# Patient Record
Sex: FEMALE | Race: WHITE | Employment: FULL TIME | RURAL
[De-identification: names, ages, dates, MRNs, and addresses within clinical notes are randomized per-mention and may not be internally consistent; named-entity substitution may affect disease eponyms.]

---

## 2017-07-10 ENCOUNTER — OFFICE VISIT (OUTPATIENT)
Dept: FAMILY MEDICINE CLINIC | Age: 52
End: 2017-07-10

## 2017-07-10 VITALS
DIASTOLIC BLOOD PRESSURE: 70 MMHG | HEART RATE: 93 BPM | TEMPERATURE: 98 F | BODY MASS INDEX: 25.88 KG/M2 | SYSTOLIC BLOOD PRESSURE: 108 MMHG | HEIGHT: 66 IN | OXYGEN SATURATION: 97 % | RESPIRATION RATE: 20 BRPM | WEIGHT: 161 LBS

## 2017-07-10 DIAGNOSIS — E03.9 ACQUIRED HYPOTHYROIDISM: ICD-10-CM

## 2017-07-10 DIAGNOSIS — R73.03 PREDIABETES: ICD-10-CM

## 2017-07-10 DIAGNOSIS — E28.319 MENOPAUSE, PREMATURE: ICD-10-CM

## 2017-07-10 DIAGNOSIS — Z00.00 ROUTINE MEDICAL EXAM: Primary | ICD-10-CM

## 2017-07-10 LAB
BILIRUB UR QL STRIP: NEGATIVE
GLUCOSE UR-MCNC: NEGATIVE MG/DL
KETONES P FAST UR STRIP-MCNC: NORMAL MG/DL
PH UR STRIP: 5 [PH] (ref 4.6–8)
PROT UR QL STRIP: NEGATIVE MG/DL
SP GR UR STRIP: 1.02 (ref 1–1.03)
UA UROBILINOGEN AMB POC: NORMAL (ref 0.2–1)
URINALYSIS CLARITY POC: CLEAR
URINALYSIS COLOR POC: YELLOW
URINE BLOOD POC: NEGATIVE
URINE LEUKOCYTES POC: NEGATIVE
URINE NITRITES POC: NEGATIVE

## 2017-07-10 NOTE — PROGRESS NOTES
Subjective:   54 y.o. female for Well Woman Check. Her gyne and breast care is done elsewhere by her Ob-Gyne physician Jan 2017. Patient Active Problem List    Diagnosis Date Noted    Well woman exam with routine gynecological exam 09/30/2015    Unspecified hypothyroidism 09/30/2015    Screening for breast cancer 09/30/2015    Menopause, premature 10/10/2011    Prediabetes     Hypothyroidism      Current Outpatient Prescriptions   Medication Sig Dispense Refill    Cetirizine (ZYRTEC) 10 mg cap Take  by mouth.  OTHER,NON-FORMULARY, Apply 0.5 mL to affected area two (2) times a day. .5ml= Biest 0.6mg, Progesterone 35mg, testosterone 1 mg(compound cream). Sig: Apply 0.5ml vaginally twice a day. 90 days supply. 3 Each 3    thyroid, Pork, (ARMOUR THYROID) 60 mg tablet Take 1 Tab by mouth daily for 90 days. 90 Tab 3    ergocalciferol (VITAMIN D2) 50,000 unit capsule Take 50,000 Units by mouth every seven (7) days.  omega-3 fatty acids-vitamin e (FISH OIL) 1,000 mg cap Take 1 capsule by mouth.  albuterol (PROVENTIL HFA, VENTOLIN HFA, PROAIR HFA) 90 mcg/actuation inhaler Take 2 Puffs by inhalation every four (4) hours as needed for Wheezing. 3 Inhaler 1    acyclovir (ZOVIRAX) 5 % ointment Apply  to affected area every three (3) hours. 2 g 1     Allergies   Allergen Reactions    Pcn [Penicillins] Rash     Past Medical History:   Diagnosis Date    Hypothyroidism     Prediabetes     Thyroid disease      Past Surgical History:   Procedure Laterality Date    HX GYN      HX PARTIAL HYSTERECTOMY  2003     Family History   Problem Relation Age of Onset    Other Father      aortic anuerysm     Social History   Substance Use Topics    Smoking status: Never Smoker    Smokeless tobacco: Never Used    Alcohol use Yes      Comment: socially             Specific concerns today: routine physical.    Review of Systems  Pertinent items are noted in HPI.     Objective:   Blood pressure 108/70, pulse 93, temperature 98 °F (36.7 °C), temperature source Oral, resp. rate 20, height 5' 6\" (1.676 m), weight 161 lb (73 kg), SpO2 97 %. Physical Examination:   General appearance - alert, well appearing, and in no distress  Eyes - pupils equal and reactive, extraocular eye movements intact  Ears - bilateral TM's and external ear canals normal  Nose - normal and patent, no erythema, discharge or polyps  Mouth - mucous membranes moist, pharynx normal without lesions  Neck - supple, no significant adenopathy  Chest - clear to auscultation, no wheezes, rales or rhonchi, symmetric air entry  Heart - normal rate, regular rhythm, normal S1, S2, no murmurs, rubs, clicks or gallops  Abdomen - soft, nontender, nondistended, no masses or organomegaly  Neurological - alert, oriented, normal speech, no focal findings or movement disorder noted  Extremities - peripheral pulses normal, no pedal edema, no clubbing or cyanosis     Assessment/Plan:   Well adult exam.      ICD-10-CM ICD-9-CM    1. Routine medical exam Z00.00 V70.0 AMB POC URINALYSIS DIP STICK AUTO W/O MICRO   2. Menopause, premature E28.319 256.31 OTHER,NON-FORMULARY,   3. Prediabetes R73.03 790.29    4.  Acquired hypothyroidism E03.9 244.9

## 2017-07-10 NOTE — PROGRESS NOTES
Identified pt with two pt identifiers(name and ). Chief Complaint   Patient presents with    Physical     work papers        There are no preventive care reminders to display for this patient. Wt Readings from Last 3 Encounters:   07/10/17 161 lb (73 kg)   16 154 lb (69.9 kg)   09/30/15 147 lb (66.7 kg)     Temp Readings from Last 3 Encounters:   07/10/17 98 °F (36.7 °C) (Oral)   16 98.2 °F (36.8 °C) (Oral)   09/30/15 98.4 °F (36.9 °C) (Oral)     BP Readings from Last 3 Encounters:   07/10/17 108/70   16 99/63   09/30/15 126/76     Pulse Readings from Last 3 Encounters:   07/10/17 93   16 86   09/30/15 77         Learning Assessment:  :     Learning Assessment 3/24/2014   PRIMARY LEARNER Patient   HIGHEST LEVEL OF EDUCATION - PRIMARY LEARNER  4 YEARS OF COLLEGE   BARRIERS PRIMARY LEARNER NONE   CO-LEARNER CAREGIVER No   PRIMARY LANGUAGE ENGLISH   LEARNER PREFERENCE PRIMARY LISTENING   ANSWERED BY patient   RELATIONSHIP SELF       Depression Screening:  :     PHQ over the last two weeks 2016   Little interest or pleasure in doing things Not at all   Feeling down, depressed or hopeless Not at all   Total Score PHQ 2 0       Fall Risk Assessment:  :     Fall Risk Assessment, last 12 mths 7/10/2017   Able to walk? Yes   Fall in past 12 months? No       Abuse Screening:  :     Abuse Screening Questionnaire 2016 3/24/2014   Do you ever feel afraid of your partner? N N   Are you in a relationship with someone who physically or mentally threatens you? N N   Is it safe for you to go home? Y Y       Coordination of Care Questionnaire:  :     1) Have you been to an emergency room, urgent care clinic since your last visit? no   Hospitalized since your last visit? no             2) Have you seen or consulted any other health care providers outside of [address] [place] [address] since your last visit?  yes  OB GYN (Include any pap smears or colon screenings in this section.)    3) Do you have an Advance Directive on file? No she has her own   Are you interested in receiving information about Advance Directives? no    Reviewed record in preparation for visit and have obtained necessary documentation. Medication reconciliation up to date and corrected with patient at this time.      Results for orders placed or performed in visit on 07/10/17   AMB POC URINALYSIS DIP STICK AUTO W/O MICRO   Result Value Ref Range    Color (UA POC) Yellow     Clarity (UA POC) Clear     Glucose (UA POC) Negative Negative    Bilirubin (UA POC) Negative Negative    Ketones (UA POC) Trace Negative    Specific gravity (UA POC) 1.025 1.001 - 1.035    Blood (UA POC) Negative Negative    pH (UA POC) 5.0 4.6 - 8.0    Protein (UA POC) Negative Negative mg/dL    Urobilinogen (UA POC) 0.2 mg/dL 0.2 - 1    Nitrites (UA POC) Negative Negative    Leukocyte esterase (UA POC) Negative Negative

## 2017-10-16 DIAGNOSIS — R06.2 WHEEZING: ICD-10-CM

## 2017-10-16 DIAGNOSIS — E03.9 ACQUIRED HYPOTHYROIDISM: ICD-10-CM

## 2017-10-16 RX ORDER — LEVOTHYROXINE AND LIOTHYRONINE 38; 9 UG/1; UG/1
60 TABLET ORAL DAILY
Qty: 90 TAB | Refills: 3 | Status: SHIPPED | OUTPATIENT
Start: 2017-10-16 | End: 2018-09-25 | Stop reason: SDUPTHER

## 2017-10-16 RX ORDER — ALBUTEROL SULFATE 90 UG/1
2 AEROSOL, METERED RESPIRATORY (INHALATION)
Qty: 3 INHALER | Refills: 1 | Status: SHIPPED | OUTPATIENT
Start: 2017-10-16 | End: 2019-03-22 | Stop reason: SDUPTHER

## 2017-10-18 DIAGNOSIS — R06.2 WHEEZING: ICD-10-CM

## 2017-10-18 DIAGNOSIS — E03.9 ACQUIRED HYPOTHYROIDISM: ICD-10-CM

## 2017-10-18 RX ORDER — ALBUTEROL SULFATE 90 UG/1
2 AEROSOL, METERED RESPIRATORY (INHALATION)
Qty: 3 INHALER | Refills: 1 | Status: CANCELLED | OUTPATIENT
Start: 2017-10-18

## 2017-10-18 RX ORDER — LEVOTHYROXINE AND LIOTHYRONINE 38; 9 UG/1; UG/1
60 TABLET ORAL DAILY
Qty: 90 TAB | Refills: 3 | Status: CANCELLED | OUTPATIENT
Start: 2017-10-18 | End: 2018-01-16

## 2018-05-10 DIAGNOSIS — E28.319 MENOPAUSE, PREMATURE: ICD-10-CM

## 2018-09-25 ENCOUNTER — OFFICE VISIT (OUTPATIENT)
Dept: FAMILY MEDICINE CLINIC | Age: 53
End: 2018-09-25

## 2018-09-25 VITALS
WEIGHT: 152 LBS | TEMPERATURE: 98.9 F | BODY MASS INDEX: 24.43 KG/M2 | HEIGHT: 66 IN | RESPIRATION RATE: 18 BRPM | SYSTOLIC BLOOD PRESSURE: 122 MMHG | HEART RATE: 80 BPM | DIASTOLIC BLOOD PRESSURE: 83 MMHG

## 2018-09-25 DIAGNOSIS — Z13.220 SCREENING FOR CHOLESTEROL LEVEL: ICD-10-CM

## 2018-09-25 DIAGNOSIS — Z23 ENCOUNTER FOR IMMUNIZATION: ICD-10-CM

## 2018-09-25 DIAGNOSIS — R73.03 PREDIABETES: ICD-10-CM

## 2018-09-25 DIAGNOSIS — B00.1 HERPES LABIALIS: ICD-10-CM

## 2018-09-25 DIAGNOSIS — Z11.3 SCREENING FOR STD (SEXUALLY TRANSMITTED DISEASE): ICD-10-CM

## 2018-09-25 DIAGNOSIS — Z12.39 SCREENING FOR BREAST CANCER: ICD-10-CM

## 2018-09-25 DIAGNOSIS — E03.9 ACQUIRED HYPOTHYROIDISM: Primary | ICD-10-CM

## 2018-09-25 DIAGNOSIS — E28.319 MENOPAUSE, PREMATURE: ICD-10-CM

## 2018-09-25 DIAGNOSIS — Z12.11 SCREENING FOR COLON CANCER: ICD-10-CM

## 2018-09-25 RX ORDER — LEVOTHYROXINE AND LIOTHYRONINE 38; 9 UG/1; UG/1
60 TABLET ORAL DAILY
Qty: 90 TAB | Refills: 1 | Status: SHIPPED | OUTPATIENT
Start: 2018-09-25 | End: 2019-03-24 | Stop reason: SDUPTHER

## 2018-09-25 RX ORDER — ACYCLOVIR 50 MG/G
OINTMENT TOPICAL
Qty: 15 G | Refills: 2 | Status: SHIPPED | OUTPATIENT
Start: 2018-09-25 | End: 2022-01-21 | Stop reason: SDUPTHER

## 2018-09-25 NOTE — PROGRESS NOTES
Subjective:      Giuseppe Aguliar is an 50 y.o. female who presents for followup of hypothyroidism. Thyroid ROS: denies fatigue, weight changes, heat/cold intolerance, bowel/skin changes or CVS symptoms. Pt states that she would like a flu vaccine today. In addition, she was informed by a previous sexual partner that he tested positive for chlamydia. She has had no symptoms, but would like testing. No change in vaginal discharge, no vaginal bleeding. In addition, she is due for colonoscopy. Patient Active Problem List    Diagnosis Date Noted    Well woman exam with routine gynecological exam 09/30/2015    Unspecified hypothyroidism 09/30/2015    Screening for breast cancer 09/30/2015    Menopause, premature 10/10/2011    Prediabetes     Hypothyroidism      Current Outpatient Prescriptions   Medication Sig Dispense Refill    acyclovir (ZOVIRAX) 5 % ointment Apply  to affected area every three (3) hours. 15 g 2    thyroid, Pork, (ARMOUR THYROID) 60 mg tablet Take 1 Tab by mouth daily for 90 days. 90 Tab 1    OTHER,NON-FORMULARY, Apply 0.5 mL to affected area two (2) times a day. .5ml= Biest 0.6mg, Progesterone 35mg, testosterone 1 mg(compound cream). Sig: Apply 0.5ml vaginally twice a day. 90 days supply. 3 Each 3    Cetirizine (ZYRTEC) 10 mg cap Take  by mouth.  albuterol (PROVENTIL HFA, VENTOLIN HFA, PROAIR HFA) 90 mcg/actuation inhaler Take 2 Puffs by inhalation every four (4) hours as needed for Wheezing. 3 Inhaler 1    ergocalciferol (VITAMIN D2) 50,000 unit capsule Take 50,000 Units by mouth every seven (7) days.  omega-3 fatty acids-vitamin e (FISH OIL) 1,000 mg cap Take 1 capsule by mouth.        Allergies   Allergen Reactions    Pcn [Penicillins] Rash     Past Medical History:   Diagnosis Date    Hypothyroidism     Prediabetes     Thyroid disease      Past Surgical History:   Procedure Laterality Date    HX GYN      HX PARTIAL HYSTERECTOMY  2003     Family History Problem Relation Age of Onset    Other Father      aortic anuerysm     Social History   Substance Use Topics    Smoking status: Never Smoker    Smokeless tobacco: Never Used    Alcohol use Yes      Comment: socially        Objective:     Visit Vitals    /83    Pulse 80    Temp 98.9 °F (37.2 °C) (Oral)    Resp 18    Ht 5' 6\" (1.676 m)    Wt 152 lb (68.9 kg)    BMI 24.53 kg/m2     Exam: thyroid is normal in size without nodules or tenderness. Laboratory:  Lab Results   Component Value Date/Time    TSH 0.963 11/02/2016 10:10 AM       Assessment/Plan:     hypothyroidism well controlled, stable, asymptomatic.   current treatment plan effective, no change in therapy. ICD-10-CM ICD-9-CM    1. Acquired hypothyroidism E03.9 244.9 thyroid, Pork, (ARMOUR THYROID) 60 mg tablet      THYROID CASCADE PROFILE      CBC W/O DIFF      METABOLIC PANEL, COMPREHENSIVE   2. Herpes labialis B00.1 054.9 acyclovir (ZOVIRAX) 5 % ointment   3. Menopause, premature E28.319 256.31 OTHER,NON-FORMULARY,   4. Prediabetes R73.03 790.29 HEMOGLOBIN A1C WITH EAG   5. Screening for colon cancer Z12.11 V76.51 REFERRAL TO GASTROENTEROLOGY      OCCULT BLOOD IMMUNOASSAY,DIAGNOSTIC   6. Screening for cholesterol level Z13.220 V77.91 LIPID PANEL   7. Screening for STD (sexually transmitted disease) Z11.3 V74.5 NUSWAB VAGINITIS PLUS   8. Encounter for immunization Z23 V03.89 INFLUENZA VIRUS VAC QUAD,SPLIT,PRESV FREE SYRINGE IM   9. Screening for breast cancer Z12.31 V76.10 JESSY MAMMO BI SCREENING INCL CAD   . Informed patient that we will notify her when labs return, and inform her of any change in plan of care at that time. Educated about calling for mammo and colonoscopy. Vaccine and VIS given. Pt informed to return to office with worsening of symptoms, or PRN with any questions or concerns. Pt verbalizes understanding of plan of care and denies further questions or concerns at this time.

## 2018-09-25 NOTE — PATIENT INSTRUCTIONS
Vaccine Information Statement    Influenza (Flu) Vaccine (Inactivated or Recombinant): What you need to know    Many Vaccine Information Statements are available in Serbian and other languages. See www.immunize.org/vis  Hojas de Información Sobre Vacunas están disponibles en Español y en muchos otros idiomas. Visite www.immunize.org/vis    1. Why get vaccinated? Influenza (flu) is a contagious disease that spreads around the [place] every year, usually between October and May. Flu is caused by influenza viruses, and is spread mainly by coughing, sneezing, and close contact. Anyone can get flu. Flu strikes suddenly and can last several days. Symptoms vary by age, but can include:   fever/chills   sore throat   muscle aches   fatigue   cough   headache    runny or stuffy nose    Flu can also lead to pneumonia and blood infections, and cause diarrhea and seizures in children. If you have a medical condition, such as heart or lung disease, flu can make it worse. Flu is more dangerous for some people. Infants and young children, people 72years of age and older, pregnant women, and people with certain health conditions or a weakened immune system are at greatest risk. Each year thousands of people in the [facility] die from flu, and many more are hospitalized. Flu vaccine can:   keep you from getting flu,   make flu less severe if you do get it, and   keep you from spreading flu to your family and other people. 2. Inactivated and recombinant flu vaccines    A dose of flu vaccine is recommended every flu season. Children 6 months through 6years of age may need two doses during the same flu season. Everyone else needs only one dose each flu season.        Some inactivated flu vaccines contain a very small amount of a mercury-based preservative called thimerosal. Studies have not shown thimerosal in vaccines to be harmful, but flu vaccines that do not contain thimerosal are available. There is no live flu virus in flu shots. They cannot cause the flu. There are many flu viruses, and they are always changing. Each year a new flu vaccine is made to protect against three or four viruses that are likely to cause disease in the upcoming flu season. But even when the vaccine doesnt exactly match these viruses, it may still provide some protection    Flu vaccine cannot prevent:   flu that is caused by a virus not covered by the vaccine, or   illnesses that look like flu but are not. It takes about 2 weeks for protection to develop after vaccination, and protection lasts through the flu season. 3. Some people should not get this vaccine    Tell the person who is giving you the vaccine:     If you have any severe, life-threatening allergies. If you ever had a life-threatening allergic reaction after a dose of flu vaccine, or have a severe allergy to any part of this vaccine, you may be advised not to get vaccinated. Most, but not all, types of flu vaccine contain a small amount of egg protein.  If you ever had Guillain-Barré Syndrome (also called GBS). Some people with a history of GBS should not get this vaccine. This should be discussed with your doctor.  If you are not feeling well. It is usually okay to get flu vaccine when you have a mild illness, but you might be asked to come back when you feel better. 4. Risks of a vaccine reaction    With any medicine, including vaccines, there is a chance of reactions. These are usually mild and go away on their own, but serious reactions are also possible. Most people who get a flu shot do not have any problems with it.      Minor problems following a flu shot include:    soreness, redness, or swelling where the shot was given     hoarseness   sore, red or itchy eyes   cough   fever   aches   headache   itching   fatigue  If these problems occur, they usually begin soon after the shot and last 1 or 2 days. More serious problems following a flu shot can include the following:     There may be a small increased risk of Guillain-Barré Syndrome (GBS) after inactivated flu vaccine. This risk has been estimated at 1 or 2 additional cases per million people vaccinated. This is much lower than the risk of severe complications from flu, which can be prevented by flu vaccine.  Young children who get the flu shot along with pneumococcal vaccine (PCV13) and/or DTaP vaccine at the same time might be slightly more likely to have a seizure caused by fever. Ask your doctor for more information. Tell your doctor if a child who is getting flu vaccine has ever had a seizure. Problems that could happen after any injected vaccine:      People sometimes faint after a medical procedure, including vaccination. Sitting or lying down for about 15 minutes can help prevent fainting, and injuries caused by a fall. Tell your doctor if you feel dizzy, or have vision changes or ringing in the ears.  Some people get severe pain in the shoulder and have difficulty moving the arm where a shot was given. This happens very rarely.  Any medication can cause a severe allergic reaction. Such reactions from a vaccine are very rare, estimated at about 1 in a million doses, and would happen within a few minutes to a few hours after the vaccination. As with any medicine, there is a very remote chance of a vaccine causing a serious injury or death. The safety of vaccines is always being monitored. For more information, visit: www.cdc.gov/vaccinesafety/    5. What if there is a serious reaction? What should I look for?  Look for anything that concerns you, such as signs of a severe allergic reaction, very high fever, or unusual behavior.     Signs of a severe allergic reaction can include hives, swelling of the face and throat, difficulty breathing, a fast heartbeat, dizziness, and weakness - usually within a few minutes to a few hours after the vaccination. What should I do?  If you think it is a severe allergic reaction or other emergency that cant wait, call 9-1-1 and get the person to the nearest hospital. Otherwise, call your doctor.  Reactions should be reported to the Vaccine Adverse Event Reporting System (VAERS). Your doctor should file this report, or you can do it yourself through  the VAERS web site at www.vaers. [facility].gov, or by calling 9-783.362.9215. VAERS does not give medical advice. 6. The National Vaccine Injury Compensation Program    The [facility] Vaccine Injury Compensation Program (VICP) is a federal program that was created to compensate people who may have been injured by certain vaccines. Persons who believe they may have been injured by a vaccine can learn about the program and about filing a claim by calling 2-857.365.4476 or visiting the [employer] website at www.[facility].gov/vaccinecompensation. There is a time limit to file a claim for compensation. 7. How can I learn more?  Ask your healthcare provider. He or she can give you the vaccine package insert or suggest other sources of information.  Call your local or state health department.  Contact the Centers for Disease Control and Prevention (CDC):  - Call 5-888.582.7854 (1-800-CDC-INFO) or  - Visit CDCs website at www.cdc.gov/flu    Vaccine Information Statement   Inactivated Influenza Vaccine   8/7/2015  44 [address] Matthew [id number]-86    Department of Health and Human Services  Centers for Disease Control and Prevention    Office Use Only

## 2018-09-25 NOTE — MR AVS SNAPSHOT
[address] [address] [address] [facility] 
[phone] Patient: Rex Tejeda MRN: [id number] Visit Information Date & Time Provider Department Dept. Phone Encounter #  
 2018  1:00 PM Carlos Celis  [facility] [phone] Follow-up Instructions Return if symptoms worsen or fail to improve. Upcoming Health Maintenance Date Due Shingrix Vaccine Age 50> (1 of 2) 10/19/2017 FOBT Q 1 YEAR AGE 50-75 10/19/2017 BREAST CANCER SCRN MAMMOGRAM 2018 Influenza Age 5 to Adult 2018 PAP AKA CERVICAL CYTOLOGY 2018 DTaP/Tdap/Td series (2 - Td) 2027 Allergies as of 2018  Review Complete On: 2018 By: Carlos Celis NP Severity Noted Reaction Type Reactions Pcn [Penicillins]  10/10/2011    Rash Current Immunizations  Reviewed on 7/10/2017 Name Date Influenza Vaccine (Quad) PF  Incomplete Tdap 2017 Not reviewed this visit You Were Diagnosed With   
  
 Codes Comments Acquired hypothyroidism    -  Primary ICD-10-CM: E03.9 ICD-9-CM: 244.9 Herpes labialis     ICD-10-CM: B00.1 ICD-9-CM: 054.9 Menopause, premature     ICD-10-CM: E28.319 ICD-9-CM: 256.31 Prediabetes     ICD-10-CM: R73.03 
ICD-9-CM: 790.29 Screening for colon cancer     ICD-10-CM: Z12.11 ICD-9-CM: V76.51 Screening for cholesterol level     ICD-10-CM: Z13.220 ICD-9-CM: V77.91 Screening for STD (sexually transmitted disease)     ICD-10-CM: Z11.3 ICD-9-CM: V74.5 Encounter for immunization     ICD-10-CM: W81 ICD-9-CM: V03.89 Screening for breast cancer     ICD-10-CM: Z12.31 
ICD-9-CM: V76.10 Vitals BP Pulse Temp Resp Height(growth percentile) Weight(growth percentile) 122/83 80 98.9 °F (37.2 °C) (Oral) 18 5' 6\" (1.676 m) 152 lb (68.9 kg) BMI OB Status Smoking Status 24.53 kg/m2 Hysterectomy Never Smoker BMI and BSA Data Body Mass Index Body Surface Area 24.53 kg/m 2 1.79 m 2 Preferred Pharmacy Pharmacy Name Phone Ethan Nunez, [facility] [phone] Your Updated Medication List  
  
   
This list is accurate as of 9/25/18  1:16 PM.  Always use your most recent med list.  
  
  
  
  
 acyclovir 5 % ointment Commonly known as:  ZOVIRAX Apply  to affected area every three (3) hours. albuterol 90 mcg/actuation inhaler Commonly known as:  PROVENTIL HFA, VENTOLIN HFA, PROAIR HFA Take 2 Puffs by inhalation every four (4) hours as needed for Wheezing. FISH OIL 1,000 mg Cap Generic drug:  omega-3 fatty acids-vitamin e Take 1 capsule by mouth. OTHER(NON-FORMULARY) Apply 0.5 mL to affected area two (2) times a day. .5ml= Biest 0.6mg, Progesterone 35mg, testosterone 1 mg(compound cream). Sig: Apply 0.5ml vaginally twice a day. 90 days supply. thyroid (Pork) 60 mg tablet Commonly known as:  ARMOUR THYROID Take 1 Tab by mouth daily for 90 days. VITAMIN D2 50,000 unit capsule Generic drug:  ergocalciferol Take 50,000 Units by mouth every seven (7) days. ZyrTEC 10 mg Cap Generic drug:  Cetirizine Take  by mouth. Prescriptions Printed Refills OTHER,NON-FORMULARY, 3 Sig: Apply 0.5 mL to affected area two (2) times a day. .5ml= Biest 0.6mg, Progesterone 35mg, testosterone 1 mg(compound cream). Sig: Apply 0.5ml vaginally twice a day. 90 days supply. Class: Print Route: Topical  
  
Prescriptions Sent to Pharmacy Refills  
 acyclovir (ZOVIRAX) 5 % ointment 2 Sig: Apply  to affected area every three (3) hours. Class: Normal  
 Pharmacy: [facility] Miles Scott, [address] [place] [address] #: [phone]  Route: Topical  
 thyroid, Pork, (ARMOUR THYROID) 60 mg tablet 1  
 Sig: Take 1 Tab by mouth daily for 90 days. Class: Normal  
 Pharmacy: [address], [address] [facility] #: [phone] Route: Oral  
  
We Performed the Following CBC W/O DIFF [04432 CPT(R)] HEMOGLOBIN A1C WITH EAG [01393 CPT(R)] INFLUENZA VIRUS VAC QUAD,SPLIT,PRESV FREE SYRINGE IM [id number] CPT(R)] LIPID PANEL [12277 CPT(R)] METABOLIC PANEL, COMPREHENSIVE [32438 CPT(R)] [address] [id number] Custom] OCCULT BLOOD IMMUNOASSAY,DIAGNOSTIC [75083 CPT(R)] REFERRAL TO GASTROENTEROLOGY [UVK77 Custom] THYROID CASCADE PROFILE [[id number] Custom] Follow-up Instructions Return if symptoms worsen or fail to improve. To-Do List   
 09/28/2018 Imaging:  [facility] MAMMO BI SCREENING INCL CAD Referral Information Referral ID Referred By Referred To  
  
 [id number] Bridger Silva, [address] [place] [address] [facility] [address] [place], [place] [address] [address] Phone: [phone] Fax: [phone] Visits Status Start Date End Date 1 New Request 9/25/18 9/25/19 If your referral has a status of pending review or denied, additional information will be sent to support the outcome of this decision. Patient Instructions Vaccine Information Statement Influenza (Flu) Vaccine (Inactivated or Recombinant): What you need to know Many Vaccine Information Statements are available in [place] and other languages. See www.immunize.org/vis Hojas de Información Sobre Vacunas están disponibles en Español y en muchos otros idiomas. Visite www.immunize.org/vis 1. Why get vaccinated? Influenza (flu) is a contagious disease that spreads around the [place] every year, usually between October and May. Flu is caused by influenza viruses, and is spread mainly by coughing, sneezing, and close contact. Anyone can get flu. Flu strikes suddenly and can last several days. Symptoms vary by age, but can include: 
 fever/chills  sore throat  muscle aches  fatigue  cough  headache  runny or stuffy nose Flu can also lead to pneumonia and blood infections, and cause diarrhea and seizures in children. If you have a medical condition, such as heart or lung disease, flu can make it worse. Flu is more dangerous for some people. Infants and young children, people 72years of age and older, pregnant women, and people with certain health conditions or a weakened immune system are at greatest risk. Each year thousands of people in the [facility] die from flu, and many more are hospitalized. Flu vaccine can: 
 keep you from getting flu, 
 make flu less severe if you do get it, and 
 keep you from spreading flu to your family and other people. 2. Inactivated and recombinant flu vaccines A dose of flu vaccine is recommended every flu season. Children 6 months through 6years of age may need two doses during the same flu season. Everyone else needs only one dose each flu season. Some inactivated flu vaccines contain a very small amount of a mercury-based preservative called thimerosal. Studies have not shown thimerosal in vaccines to be harmful, but flu vaccines that do not contain thimerosal are available. There is no live flu virus in flu shots. They cannot cause the flu. There are many flu viruses, and they are always changing. Each year a new flu vaccine is made to protect against three or four viruses that are likely to cause disease in the upcoming flu season. But even when the vaccine doesnt exactly match these viruses, it may still provide some protection Flu vaccine cannot prevent: 
 flu that is caused by a virus not covered by the vaccine, or 
 illnesses that look like flu but are not. It takes about 2 weeks for protection to develop after vaccination, and protection lasts through the flu season. 3. Some people should not get this vaccine Tell the person who is giving you the vaccine:  If you have any severe, life-threatening allergies. If you ever had a life-threatening allergic reaction after a dose of flu vaccine, or have a severe allergy to any part of this vaccine, you may be advised not to get vaccinated. Most, but not all, types of flu vaccine contain a small amount of egg protein.  If you ever had Guillain-Barré Syndrome (also called GBS). Some people with a history of GBS should not get this vaccine. This should be discussed with your doctor.  If you are not feeling well. It is usually okay to get flu vaccine when you have a mild illness, but you might be asked to come back when you feel better. 4. Risks of a vaccine reaction With any medicine, including vaccines, there is a chance of reactions. These are usually mild and go away on their own, but serious reactions are also possible. Most people who get a flu shot do not have any problems with it. Minor problems following a flu shot include:  
 soreness, redness, or swelling where the shot was given  hoarseness  sore, red or itchy eyes  cough  fever  aches  headache  itching  fatigue If these problems occur, they usually begin soon after the shot and last 1 or 2 days. More serious problems following a flu shot can include the following:  There may be a small increased risk of Guillain-Barré Syndrome (GBS) after inactivated flu vaccine. This risk has been estimated at 1 or 2 additional cases per million people vaccinated. This is much lower than the risk of severe complications from flu, which can be prevented by flu vaccine.  Young children who get the flu shot along with pneumococcal vaccine (PCV13) and/or DTaP vaccine at the same time might be slightly more likely to have a seizure caused by fever. Ask your doctor for more information. Tell your doctor if a child who is getting flu vaccine has ever had a seizure. Problems that could happen after any injected vaccine:  People sometimes faint after a medical procedure, including vaccination. Sitting or lying down for about 15 minutes can help prevent fainting, and injuries caused by a fall. Tell your doctor if you feel dizzy, or have vision changes or ringing in the ears.  Some people get severe pain in the shoulder and have difficulty moving the arm where a shot was given. This happens very rarely.  Any medication can cause a severe allergic reaction. Such reactions from a vaccine are very rare, estimated at about 1 in a million doses, and would happen within a few minutes to a few hours after the vaccination. As with any medicine, there is a very remote chance of a vaccine causing a serious injury or death. The safety of vaccines is always being monitored. For more information, visit: www.cdc.gov/vaccinesafety/ 
 
 
6. The National Vaccine Injury [employer] [place] 
 
 The [employer] Injury Compensation Program (VICP) is a federal program that was created to compensate people who may have been injured by certain vaccines. Persons who believe they may have been injured by a vaccine can learn about the program and about filing a claim by calling 8-771.229.4830 or visiting the [employer] website at www.[facility].gov/vaccinecompensation. There is a time limit to file a claim for compensation. 7. How can I learn more?  Ask your healthcare provider. He or she can give you the vaccine package insert or suggest other sources of information.  Call your local or state health department.  Contact the Centers for Disease Control and Prevention (CDC): 
- Call 5-611.943.4548 (1-800-CDC-INFO) or 
- Visit CDCs website at www.cdc.gov/flu Vaccine Information Statement Inactivated Influenza Vaccine 8/7/2015 
42 UDoc Torres Sample [id number]-16 Department of [facility] and [employer] Centers for Disease Control and Prevention Office Use Only \[facility]\"" & HEALTH SERVICES! Dear Ani Jones: Thank you for requesting a [employer] account. Our records indicate that you already have an active [employer] account. You can access your account anytime at [email or web address] Did you know that you can access your hospital and ER discharge instructions at any time in [employer]? You can also review all of your test results from your hospital stay or ER visit. Additional Information If you have questions, please visit the Frequently Asked Questions section of the [employer] website at [email or web address] Remember, [employer] is NOT to be used for urgent needs. For medical emergencies, dial 911. Now available from your iPhone and Android! Please provide this summary of care documentation to your next provider. Your primary care clinician is listed as Steve Qureshi. If you have any questions after today's visit, please call [phone].

## 2018-09-26 LAB
ALBUMIN SERPL-MCNC: 5 G/DL (ref 3.5–5.5)
ALBUMIN/GLOB SERPL: 2.1 {RATIO} (ref 1.2–2.2)
ALP SERPL-CCNC: 47 IU/L (ref 39–117)
ALT SERPL-CCNC: 21 IU/L (ref 0–32)
AST SERPL-CCNC: 18 IU/L (ref 0–40)
BILIRUB SERPL-MCNC: 0.5 MG/DL (ref 0–1.2)
BUN SERPL-MCNC: 11 MG/DL (ref 6–24)
BUN/CREAT SERPL: 15 (ref 9–23)
CALCIUM SERPL-MCNC: 9.4 MG/DL (ref 8.7–10.2)
CHLORIDE SERPL-SCNC: 99 MMOL/L (ref 96–106)
CHOLEST SERPL-MCNC: 263 MG/DL (ref 100–199)
CO2 SERPL-SCNC: 20 MMOL/L (ref 20–29)
CREAT SERPL-MCNC: 0.71 MG/DL (ref 0.57–1)
ERYTHROCYTE [DISTWIDTH] IN BLOOD BY AUTOMATED COUNT: 13.5 % (ref 12.3–15.4)
EST. AVERAGE GLUCOSE BLD GHB EST-MCNC: 103 MG/DL
GLOBULIN SER CALC-MCNC: 2.4 G/DL (ref 1.5–4.5)
GLUCOSE SERPL-MCNC: 69 MG/DL (ref 65–99)
HBA1C MFR BLD: 5.2 % (ref 4.8–5.6)
HCT VFR BLD AUTO: 41.2 % (ref 34–46.6)
HDLC SERPL-MCNC: 69 MG/DL
HGB BLD-MCNC: 13.4 G/DL (ref 11.1–15.9)
INTERPRETATION, 910389: NORMAL
LDLC SERPL CALC-MCNC: 182 MG/DL (ref 0–99)
MCH RBC QN AUTO: 31.3 PG (ref 26.6–33)
MCHC RBC AUTO-ENTMCNC: 32.5 G/DL (ref 31.5–35.7)
MCV RBC AUTO: 96 FL (ref 79–97)
PLATELET # BLD AUTO: 273 X10E3/UL (ref 150–379)
POTASSIUM SERPL-SCNC: 4.2 MMOL/L (ref 3.5–5.2)
PROT SERPL-MCNC: 7.4 G/DL (ref 6–8.5)
RBC # BLD AUTO: 4.28 X10E6/UL (ref 3.77–5.28)
SODIUM SERPL-SCNC: 138 MMOL/L (ref 134–144)
TRIGL SERPL-MCNC: 62 MG/DL (ref 0–149)
TSH SERPL DL<=0.005 MIU/L-ACNC: 0.51 UIU/ML (ref 0.45–4.5)
VLDLC SERPL CALC-MCNC: 12 MG/DL (ref 5–40)
WBC # BLD AUTO: 7.3 X10E3/UL (ref 3.4–10.8)

## 2018-09-26 NOTE — PROGRESS NOTES
Please call patient and let her know that her labs returned:  1. Cholesterol is high. LDL is 182. HDL is also high, which is cardioprotective. She should focus on mediterranean diet, and this should be rechecked in 6 months, to see if medication is indicated. Otherwise, stable! Thanks!

## 2018-09-27 ENCOUNTER — TELEPHONE (OUTPATIENT)
Dept: FAMILY MEDICINE CLINIC | Age: 53
End: 2018-09-27

## 2018-09-27 NOTE — TELEPHONE ENCOUNTER
Notes Recorded by Earle Angeles LPN on 9/20/9059 at 4:49 PM  Pt reports she viewed the results/recommendations on my chart and she has no further questions.

## 2018-09-28 LAB
A VAGINAE DNA VAG QL NAA+PROBE: NORMAL SCORE
BVAB2 DNA VAG QL NAA+PROBE: NORMAL SCORE
C ALBICANS DNA VAG QL NAA+PROBE: NEGATIVE
C GLABRATA DNA VAG QL NAA+PROBE: NEGATIVE
C TRACH RRNA SPEC QL NAA+PROBE: NEGATIVE
MEGA1 DNA VAG QL NAA+PROBE: NORMAL SCORE
N GONORRHOEA RRNA SPEC QL NAA+PROBE: NEGATIVE
T VAGINALIS RRNA SPEC QL NAA+PROBE: NEGATIVE

## 2018-09-28 NOTE — PROGRESS NOTES
Please call patient and let her know that her NuSwab returned and is negative. Pt did state that you can leave voice mail, if she does not answer.   Thanks

## 2018-12-17 DIAGNOSIS — E28.319 MENOPAUSE, PREMATURE: ICD-10-CM

## 2019-03-22 DIAGNOSIS — R06.2 WHEEZING: ICD-10-CM

## 2019-03-23 RX ORDER — ALBUTEROL SULFATE 90 UG/1
AEROSOL, METERED RESPIRATORY (INHALATION)
Qty: 25.5 INHALER | Refills: 1 | Status: SHIPPED | OUTPATIENT
Start: 2019-03-23 | End: 2022-01-21 | Stop reason: SDUPTHER

## 2019-03-24 DIAGNOSIS — E03.9 ACQUIRED HYPOTHYROIDISM: ICD-10-CM

## 2019-03-24 PROBLEM — E78.00 HYPERCHOLESTEROLEMIA: Status: ACTIVE | Noted: 2019-03-24

## 2019-03-24 RX ORDER — SULFAMETHOXAZOLE AND TRIMETHOPRIM 800; 160 MG/1; MG/1
TABLET ORAL
Qty: 90 TAB | Refills: 1 | Status: SHIPPED | OUTPATIENT
Start: 2019-03-24 | End: 2019-09-20 | Stop reason: SDUPTHER

## 2019-05-17 DIAGNOSIS — E28.319 MENOPAUSE, PREMATURE: ICD-10-CM

## 2019-06-05 ENCOUNTER — OFFICE VISIT (OUTPATIENT)
Dept: FAMILY MEDICINE CLINIC | Age: 54
End: 2019-06-05

## 2019-06-05 VITALS
HEART RATE: 71 BPM | OXYGEN SATURATION: 99 % | DIASTOLIC BLOOD PRESSURE: 80 MMHG | HEIGHT: 66 IN | WEIGHT: 158.4 LBS | TEMPERATURE: 98.5 F | RESPIRATION RATE: 20 BRPM | BODY MASS INDEX: 25.46 KG/M2 | SYSTOLIC BLOOD PRESSURE: 110 MMHG

## 2019-06-05 DIAGNOSIS — E03.9 ACQUIRED HYPOTHYROIDISM: ICD-10-CM

## 2019-06-05 DIAGNOSIS — E28.319 MENOPAUSE, PREMATURE: ICD-10-CM

## 2019-06-05 DIAGNOSIS — E78.00 HYPERCHOLESTEROLEMIA: ICD-10-CM

## 2019-06-05 DIAGNOSIS — Z12.11 SCREENING FOR COLON CANCER: ICD-10-CM

## 2019-06-05 DIAGNOSIS — Z00.00 ROUTINE CHECK-UP: Primary | ICD-10-CM

## 2019-06-05 DIAGNOSIS — N95.2 VAGINAL ATROPHY: ICD-10-CM

## 2019-06-05 LAB
BILIRUB UR QL STRIP: NEGATIVE
GLUCOSE UR-MCNC: NEGATIVE MG/DL
KETONES P FAST UR STRIP-MCNC: NEGATIVE MG/DL
PH UR STRIP: 7 [PH] (ref 4.6–8)
PROT UR QL STRIP: NEGATIVE
SP GR UR STRIP: 1 (ref 1–1.03)
UA UROBILINOGEN AMB POC: NORMAL (ref 0.2–1)
URINALYSIS CLARITY POC: CLEAR
URINALYSIS COLOR POC: YELLOW
URINE BLOOD POC: NEGATIVE
URINE LEUKOCYTES POC: NEGATIVE
URINE NITRITES POC: NEGATIVE

## 2019-06-05 RX ORDER — ESTRADIOL 0.1 MG/G
CREAM VAGINAL
Qty: 42.5 G | Refills: 5 | Status: SHIPPED | OUTPATIENT
Start: 2019-06-05 | End: 2021-05-17 | Stop reason: SDUPTHER

## 2019-06-05 NOTE — PROGRESS NOTES
Subjective:   48 y.o. female for Well Woman Check. Her gyne and breast care is done elsewhere by her Ob-Gyne physician. Last PAP done 2017 in [place]. Patient Active Problem List    Diagnosis Date Noted    Hypercholesterolemia 03/24/2019    Well woman exam with routine gynecological exam 09/30/2015    Unspecified hypothyroidism 09/30/2015    Screening for breast cancer 09/30/2015    Menopause, premature 10/10/2011    Prediabetes     Hypothyroidism      Current Outpatient Medications   Medication Sig Dispense Refill    estradiol (ESTRACE) 0.01 % (0.1 mg/gram) vaginal cream apply twice a week 42.5 g 5    OTHER,NON-FORMULARY, Apply 0.5 mL to affected area two (2) times a day. .5ml= Biest 0.6mg, Progesterone 35mg, testosterone 1 mg(compound cream). Sig: Apply 0.5ml vaginally twice a day. 90 days supply. 3 Each 5    ARMOUR THYROID 60 mg tablet TAKE 1 TABLET DAILY 90 Tab 1    ergocalciferol (VITAMIN D2) 50,000 unit capsule Take 50,000 Units by mouth every seven (7) days.  PROAIR HFA 90 mcg/actuation inhaler USE 2 INHALATIONS EVERY 4 HOURS AS NEEDED FOR WHEEZING 25.5 Inhaler 1    acyclovir (ZOVIRAX) 5 % ointment Apply  to affected area every three (3) hours. 15 g 2    Cetirizine (ZYRTEC) 10 mg cap Take  by mouth.  omega-3 fatty acids-vitamin e (FISH OIL) 1,000 mg cap Take 1 capsule by mouth.        Allergies   Allergen Reactions    Pcn [Penicillins] Rash     Past Medical History:   Diagnosis Date    Hypercholesterolemia 3/24/2019    Hypothyroidism     Prediabetes     Thyroid disease      Past Surgical History:   Procedure Laterality Date    HX GYN      HX PARTIAL HYSTERECTOMY  2003     Family History   Problem Relation Age of Onset    Other Father         aortic anuerysm     Social History     Tobacco Use    Smoking status: Never Smoker    Smokeless tobacco: Never Used   Substance Use Topics    Alcohol use: Yes     Comment: socially        Lab Results   Component Value Date/Time WBC 7.3 09/25/2018 01:33 PM    HGB 13.4 09/25/2018 01:33 PM    HCT 41.2 09/25/2018 01:33 PM    PLATELET 349 45/99/0516 01:33 PM    MCV 96 09/25/2018 01:33 PM     Lab Results   Component Value Date/Time    Hemoglobin A1c 5.2 09/25/2018 01:33 PM    Hemoglobin A1c 5.8 (H) 11/02/2016 10:10 AM    Hemoglobin A1c 5.4 09/30/2015 09:48 AM    Glucose 69 09/25/2018 01:33 PM    LDL, calculated 182 (H) 09/25/2018 01:33 PM    Creatinine 0.71 09/25/2018 01:33 PM      Lab Results   Component Value Date/Time    Cholesterol, total 263 (H) 09/25/2018 01:33 PM    HDL Cholesterol 69 09/25/2018 01:33 PM    LDL, calculated 182 (H) 09/25/2018 01:33 PM    Triglyceride 62 09/25/2018 01:33 PM     Lab Results   Component Value Date/Time    ALT (SGPT) 21 09/25/2018 01:33 PM    AST (SGOT) 18 09/25/2018 01:33 PM    Alk. phosphatase 47 09/25/2018 01:33 PM    Bilirubin, total 0.5 09/25/2018 01:33 PM    Albumin 5.0 09/25/2018 01:33 PM    Protein, total 7.4 09/25/2018 01:33 PM    PLATELET 297 75/35/4218 01:33 PM     Lab Results   Component Value Date/Time    GFR est non- 09/25/2018 01:33 PM    GFR est  09/25/2018 01:33 PM    Creatinine 0.71 09/25/2018 01:33 PM    BUN 11 09/25/2018 01:33 PM    Sodium 138 09/25/2018 01:33 PM    Potassium 4.2 09/25/2018 01:33 PM    Chloride 99 09/25/2018 01:33 PM    CO2 20 09/25/2018 01:33 PM     Lab Results   Component Value Date/Time    TSH 0.514 09/25/2018 01:33 PM    TSH 0.963 11/02/2016 10:10 AM    T4, Free 0.92 11/02/2016 10:10 AM         Specific concerns today: need med refills for HRT. Also need recheck lipids. She is no longer on keto diet. Feels well. She is travelling nurse. Review of Systems  Pertinent items are noted in HPI. Objective:   Blood pressure 110/80, pulse 71, temperature 98.5 °F (36.9 °C), temperature source Oral, resp. rate 20, height 5' 6\" (1.676 m), weight 158 lb 6.4 oz (71.8 kg), SpO2 99 %.    Physical Examination:   General appearance - alert, well appearing, and in no distress  Ears - bilateral TM's and external ear canals normal  Nose - normal and patent, no erythema, discharge or polyps  Mouth - mucous membranes moist, pharynx normal without lesions  Chest - clear to auscultation, no wheezes, rales or rhonchi, symmetric air entry  Heart - normal rate, regular rhythm, normal S1, S2, no murmurs, rubs, clicks or gallops  Abdomen - soft, nontender, nondistended, no masses or organomegaly  Musculoskeletal - no joint tenderness, deformity or swelling  Extremities - peripheral pulses normal, no pedal edema, no clubbing or cyanosis     Assessment/Plan:   Well woman  lose weight, increase physical activity, follow low fat diet, follow low salt diet, routine labs ordered    ICD-10-CM ICD-9-CM    1. Routine check-up Z00.00 V70.0 AMB POC URINALYSIS DIP STICK AUTO W/O MICRO   2. Menopause, premature E28.319 256.31 OTHER,NON-FORMULARY,   3. Screening for colon cancer Z12.11 V76.51 REFERRAL TO GASTROENTEROLOGY   4. Vaginal atrophy N95.2 627.3 estradiol (ESTRACE) 0.01 % (0.1 mg/gram) vaginal cream   5. Acquired hypothyroidism E03.9 244.9 TSH 3RD GENERATION      T4, FREE   6. Hypercholesterolemia E78.00 272.0 LIPID PANEL     Labs drawn. Track down last PAP done in 2017 by Paulina Ching Dr in [place].

## 2019-06-05 NOTE — PROGRESS NOTES
Identified pt with two pt identifiers(name and ). Chief Complaint   Patient presents with    Medication Refill        Health Maintenance Due   Topic    Shingrix Vaccine Age 50> (1 of 2)    FOBT Q 1 YEAR AGE 50-75     BREAST CANCER SCRN MAMMOGRAM     PAP AKA CERVICAL CYTOLOGY    GYN done Malaika Ramos MD    Wt Readings from Last 3 Encounters:   19 158 lb 6.4 oz (71.8 kg)   18 152 lb (68.9 kg)   07/10/17 161 lb (73 kg)     Temp Readings from Last 3 Encounters:   19 98.5 °F (36.9 °C) (Oral)   18 98.9 °F (37.2 °C) (Oral)   07/10/17 98 °F (36.7 °C) (Oral)     BP Readings from Last 3 Encounters:   19 110/80   18 122/83   07/10/17 108/70     Pulse Readings from Last 3 Encounters:   19 71   18 80   07/10/17 93         Learning Assessment:  :     Learning Assessment 3/24/2014   PRIMARY LEARNER Patient   HIGHEST LEVEL OF EDUCATION - PRIMARY LEARNER  4 YEARS OF COLLEGE   BARRIERS PRIMARY LEARNER NONE   CO-LEARNER CAREGIVER No   PRIMARY LANGUAGE ENGLISH   LEARNER PREFERENCE PRIMARY LISTENING   ANSWERED BY patient   RELATIONSHIP SELF       Depression Screening:  :     3 most recent PHQ Screens 2019   Little interest or pleasure in doing things Not at all   Feeling down, depressed, irritable, or hopeless Not at all   Total Score PHQ 2 0       Fall Risk Assessment:  :     Fall Risk Assessment, last 12 mths 7/10/2017   Able to walk? Yes   Fall in past 12 months? No       Abuse Screening:  :     Abuse Screening Questionnaire 2019 2016 3/24/2014   Do you ever feel afraid of your partner? N N N   Are you in a relationship with someone who physically or mentally threatens you? N N N   Is it safe for you to go home?  Y Y Y       Coordination of Care Questionnaire:  :     1) Have you been to an emergency room, urgent care clinic since your last visit? no   Hospitalized since your last visit? no             2) Have you seen or consulted any other health care providers outside of [address] [place] [address] since your last visit? yes GYN (Include any pap smears or colon screenings in this section.)    3) Do you have an Advance Directive on file? no  Are you interested in receiving information about Advance Directives? no    Reviewed record in preparation for visit and have obtained necessary documentation. Medication reconciliation up to date and corrected with patient at this time.      Results for orders placed or performed in visit on 06/05/19   AMB POC URINALYSIS DIP STICK AUTO W/O MICRO   Result Value Ref Range    Color (UA POC) Yellow     Clarity (UA POC) Clear     Glucose (UA POC) Negative Negative    Bilirubin (UA POC) Negative Negative    Ketones (UA POC) Negative Negative    Specific gravity (UA POC) 1.005 1.001 - 1.035    Blood (UA POC) Negative Negative    pH (UA POC) 7.0 4.6 - 8.0    Protein (UA POC) Negative Negative    Urobilinogen (UA POC) 0.2 mg/dL 0.2 - 1    Nitrites (UA POC) Negative Negative    Leukocyte esterase (UA POC) Negative Negative

## 2019-06-06 LAB
CHOLEST SERPL-MCNC: 220 MG/DL (ref 100–199)
HDLC SERPL-MCNC: 68 MG/DL
INTERPRETATION, 910389: NORMAL
LDLC SERPL CALC-MCNC: 141 MG/DL (ref 0–99)
T4 FREE SERPL-MCNC: 1.13 NG/DL (ref 0.82–1.77)
TRIGL SERPL-MCNC: 56 MG/DL (ref 0–149)
TSH SERPL DL<=0.005 MIU/L-ACNC: 0.76 UIU/ML (ref 0.45–4.5)
VLDLC SERPL CALC-MCNC: 11 MG/DL (ref 5–40)

## 2019-06-17 ENCOUNTER — HOSPITAL ENCOUNTER (OUTPATIENT)
Dept: MAMMOGRAPHY | Age: 54
Discharge: HOME | End: 2019-06-17
Attending: NURSE PRACTITIONER
Payer: OTHER GOVERNMENT

## 2019-06-17 DIAGNOSIS — Z12.39 SCREENING FOR BREAST CANCER: ICD-10-CM

## 2019-06-17 PROCEDURE — 77067 SCR MAMMO BI INCL CAD: CPT

## 2019-06-18 ENCOUNTER — TELEPHONE (OUTPATIENT)
Dept: FAMILY MEDICINE CLINIC | Age: 54
End: 2019-06-18

## 2019-06-18 NOTE — TELEPHONE ENCOUNTER
Please obtain [facility] authorization for colonoscopy with Dr Do Mcdaniel.

## 2019-06-18 NOTE — TELEPHONE ENCOUNTER
Regarding: FW: Referral Request  Contact: [phone]      ----- Message -----  From: Tonya Gallardo  Sent: 6/18/2019   6:48 AM  To: Edison Nielsen  Subject: Referral Request                                 ----- Message from [address] [place] [address] 951, [facility] sent at 6/18/2019  6:48 AM EDT -----    Colonoscopy scheduled with Dr Zoya Pozo. Please send referral to [facility] [place]. Thank you!

## 2019-06-19 ENCOUNTER — TELEPHONE (OUTPATIENT)
Dept: FAMILY MEDICINE CLINIC | Age: 54
End: 2019-06-19

## 2019-06-19 NOTE — TELEPHONE ENCOUNTER
Pt had a mammogram Monday 6/17/19 and was told she needs to come back and get further images of right breast and US but never told pt why she needed further testing done.  Pt would like a call to get the results of the mammogram  Contact pt at [phone]

## 2019-06-20 ENCOUNTER — TELEPHONE (OUTPATIENT)
Dept: FAMILY MEDICINE CLINIC | Age: 54
End: 2019-06-20

## 2019-06-20 NOTE — TELEPHONE ENCOUNTER
Regarding: FW: Referral Request  Contact: [phone]      ----- Message -----  From: Selwyn Mancini  Sent: 6/20/2019  11:41 AM  To: Maxine Ramon Pool  Subject: Referral Request                                 ----- Message from [address] [place] [address] 951, [facility] sent at 6/20/2019 11:41 AM EDT -----    [place] said to request an evaluate and treat referral for the follow up Right mammogram and ultrasound. Thank you, please let me know if you need anything else from me.   Selwyn Mancini

## 2019-07-11 ENCOUNTER — HOSPITAL ENCOUNTER (OUTPATIENT)
Dept: MAMMOGRAPHY | Age: 54
Discharge: HOME | End: 2019-07-11
Attending: NURSE PRACTITIONER
Payer: OTHER GOVERNMENT

## 2019-07-11 ENCOUNTER — OFFICE VISIT (OUTPATIENT)
Dept: FAMILY MEDICINE CLINIC | Age: 54
End: 2019-07-11

## 2019-07-11 ENCOUNTER — TELEPHONE (OUTPATIENT)
Dept: FAMILY MEDICINE CLINIC | Age: 54
End: 2019-07-11

## 2019-07-11 VITALS
SYSTOLIC BLOOD PRESSURE: 112 MMHG | HEART RATE: 66 BPM | TEMPERATURE: 97.7 F | OXYGEN SATURATION: 97 % | DIASTOLIC BLOOD PRESSURE: 80 MMHG | BODY MASS INDEX: 26.23 KG/M2 | WEIGHT: 163.2 LBS | RESPIRATION RATE: 20 BRPM | HEIGHT: 66 IN

## 2019-07-11 DIAGNOSIS — N63.0 BREAST MASS IN FEMALE: Primary | ICD-10-CM

## 2019-07-11 DIAGNOSIS — R92.8 ABNORMAL MAMMOGRAM: ICD-10-CM

## 2019-07-11 DIAGNOSIS — D22.9 MULTIPLE ATYPICAL SKIN MOLES: Primary | ICD-10-CM

## 2019-07-11 PROCEDURE — 77065 DX MAMMO INCL CAD UNI: CPT

## 2019-07-11 PROCEDURE — 76642 ULTRASOUND BREAST LIMITED: CPT

## 2019-07-11 NOTE — PROGRESS NOTES
Identified pt with two pt identifiers(name and ). Chief Complaint   Patient presents with    Mole     she has a new one        Health Maintenance Due   Topic    Shingrix Vaccine Age 50> (1 of 2)    FOBT Q 1 YEAR AGE 50-75     PAP AKA CERVICAL CYTOLOGY        Wt Readings from Last 3 Encounters:   19 163 lb 3.2 oz (74 kg)   19 158 lb 6.4 oz (71.8 kg)   18 152 lb (68.9 kg)     Temp Readings from Last 3 Encounters:   19 97.7 °F (36.5 °C) (Oral)   19 98.5 °F (36.9 °C) (Oral)   18 98.9 °F (37.2 °C) (Oral)     BP Readings from Last 3 Encounters:   19 112/80   19 110/80   18 122/83     Pulse Readings from Last 3 Encounters:   19 66   19 71   18 80         Learning Assessment:  :     Learning Assessment 3/24/2014   PRIMARY LEARNER Patient   HIGHEST LEVEL OF EDUCATION - PRIMARY LEARNER  4 YEARS OF COLLEGE   BARRIERS PRIMARY LEARNER NONE   CO-LEARNER CAREGIVER No   PRIMARY LANGUAGE ENGLISH   LEARNER PREFERENCE PRIMARY LISTENING   ANSWERED BY patient   RELATIONSHIP SELF       Depression Screening:  :     3 most recent PHQ Screens 2019   Little interest or pleasure in doing things Not at all   Feeling down, depressed, irritable, or hopeless Not at all   Total Score PHQ 2 0       Fall Risk Assessment:  :     Fall Risk Assessment, last 12 mths 7/10/2017   Able to walk? Yes   Fall in past 12 months? No       Abuse Screening:  :     Abuse Screening Questionnaire 2019 2016 3/24/2014   Do you ever feel afraid of your partner? N N N   Are you in a relationship with someone who physically or mentally threatens you? N N N   Is it safe for you to go home?  Natalie Jones       Coordination of Care Questionnaire:  :     1) Have you been to an emergency room, urgent care clinic since your last visit? no   Hospitalized since your last visit? no             2) Have you seen or consulted any other health care providers outside of [address] [place] [address] since your last visit? no  (Include any pap smears or colon screenings in this section.)    3) Do you have an Advance Directive on file? no  Are you interested in receiving information about Advance Directives? no    Reviewed record in preparation for visit and have obtained necessary documentation. Medication reconciliation up to date and corrected with patient at this time.

## 2019-07-11 NOTE — PROGRESS NOTES
HISTORY OF PRESENT ILLNESS  Shun Geller is a 48 y.o. female. HPI   Pt presents with \"multiple moles\"  Pt states that she has a couple of moles that she would like looked at. She notes that she has a new one on her chest, that first popped up about a month ago. When it first came, she felt like it looked different then normal mole? No itching or pain the area  Review of Systems   Constitutional: Negative for fever. Gastrointestinal: Negative for diarrhea and vomiting. Physical Exam   Constitutional: She is oriented to person, place, and time. She appears well-developed and well-nourished. HENT:   Head: Normocephalic and atraumatic. Cardiovascular: Normal rate, regular rhythm and normal heart sounds. Pulmonary/Chest: Effort normal and breath sounds normal.   Neurological: She is alert and oriented to person, place, and time. Skin: Skin is warm and dry. Psychiatric: She has a normal mood and affect. Her behavior is normal.       ASSESSMENT and PLAN    ICD-10-CM ICD-9-CM    1. Multiple atypical skin moles D22.9 216.9 REFERRAL TO DERMATOLOGY     Educated about calling dermatology for an appointment today    Pt informed to return to office with worsening of symptoms, or PRN with any questions or concerns. Pt verbalizes understanding of plan of care and denies further questions or concerns at this time.

## 2019-07-11 NOTE — TELEPHONE ENCOUNTER
[facility] called and states that Pt has a Mass and will need a \"US guided biopsy. \" They are requesting a order for this unless pt should be seen by general surgery.     Best contact: [phone]

## 2019-07-11 NOTE — PATIENT INSTRUCTIONS
Moles: Care Instructions  Your Care Instructions  Moles are skin growths made up of cells that produce color (pigment). A mole can appear anywhere on the skin, alone or in groups. Most people get a few moles during their first 20 years of life. They are usually brown in color but can be blue, black, or flesh-colored. Most moles are harmless and do not cause pain or other symptoms, unless you rub them or they bump against something. You usually do not need treatment for moles. But some can turn into cancer. Talk to your doctor if a mole bleeds, itches, burns, or changes size or color. Also let your doctor know if you get a new mole. Make sure to wear sunscreen and other sun protection every day to help prevent skin cancer. Follow-up care is a key part of your treatment and safety. Be sure to make and go to all appointments, and call your doctor if you are having problems. It's also a good idea to know your test results and keep a list of the medicines you take. How can you care for yourself at home? · Check all the skin on your body once a month for skin growths or other changes, such as in the color and feel of the skin. ?  front of a full-length mirror. Look carefully at the front and back of your body. Then look at your right and left sides with your arms raised. ? Bend your elbows and look carefully at your forearms, the back of your upper arms, and your palms. ? Look at your feet, the bottoms of your feet, and the spaces between your toes. ? Use a hand mirror to look at the back of your legs, the back of your neck, and your back, rear end (buttocks), and genital area. Part the hair on your head to look at your scalp. · If you see a change in a skin growth, contact your doctor. Look for:  ? A mole that bleeds. ? A fast-growing mole. ? A scaly or crusted growth on the skin. ? A sore that will not heal.  To prevent skin cancer  · Always wear sunscreen on exposed skin.  Make sure to use a broad-spectrum sunscreen that has a sun protection factor (SPF) of 30 or higher. Use it every day, even when it is cloudy. · Wear a wide-brimmed hat and long sleeves and pants if you are going to be outdoors for very long. · Avoid the sun between 10 a.m. and 4 p.m., which is the peak time for the sun's ultraviolet rays. · Avoid sunburns, tanning booths, and sunlamps. · Be sure to protect children from the sun. Sunburns in childhood damage the skin and increase the risk of cancer. When should you call for help? Watch closely for changes in your health, and be sure to contact your doctor if:    · A mole looks different than it did before. It may have changed in size, color, shape, or the way it looks. Where can you learn more? Go to http://leela-kiesha.info/. Enter M489 in the search box to learn more about \"Moles: Care Instructions. \"  Current as of: April 17, 2018  Content Version: 11.9  © 9486-7594 [employer]. Care instructions adapted under license by [employer] (which disclaims liability or warranty for this information). If you have questions about a medical condition or this instruction, always ask your healthcare professional. [address] any warranty or liability for your use of this information.

## 2019-07-30 ENCOUNTER — HOSPITAL ENCOUNTER (OUTPATIENT)
Dept: MAMMOGRAPHY | Age: 54
Discharge: HOME | End: 2019-07-30
Attending: NURSE PRACTITIONER
Payer: OTHER GOVERNMENT

## 2019-07-30 DIAGNOSIS — N63.10 BREAST MASS, RIGHT: ICD-10-CM

## 2019-07-30 PROCEDURE — 74011000250 HC RX REV CODE- 250: Performed by: RADIOLOGY

## 2019-07-30 PROCEDURE — 88305 TISSUE EXAM BY PATHOLOGIST: CPT

## 2019-07-30 PROCEDURE — 19083 BX BREAST 1ST LESION US IMAG: CPT

## 2019-07-30 PROCEDURE — 77065 DX MAMMO INCL CAD UNI: CPT

## 2019-07-30 PROCEDURE — 74011250636 HC RX REV CODE- 250/636: Performed by: RADIOLOGY

## 2019-07-30 RX ORDER — LIDOCAINE HYDROCHLORIDE AND EPINEPHRINE 10; 10 MG/ML; UG/ML
8 INJECTION, SOLUTION INFILTRATION; PERINEURAL ONCE
Status: DISPENSED | OUTPATIENT
Start: 2019-07-30 | End: 2019-07-30

## 2019-07-30 RX ORDER — LIDOCAINE HYDROCHLORIDE 10 MG/ML
12 INJECTION, SOLUTION EPIDURAL; INFILTRATION; INTRACAUDAL; PERINEURAL
Status: COMPLETED | OUTPATIENT
Start: 2019-07-30 | End: 2019-07-30

## 2019-07-30 RX ORDER — LIDOCAINE HYDROCHLORIDE 10 MG/ML
12 INJECTION INFILTRATION; PERINEURAL
Status: DISCONTINUED | OUTPATIENT
Start: 2019-07-30 | End: 2019-07-30

## 2019-07-30 RX ADMIN — LIDOCAINE HYDROCHLORIDE 12 ML: 10 INJECTION, SOLUTION EPIDURAL; INFILTRATION; INTRACAUDAL; PERINEURAL at 11:20

## 2019-07-30 RX ADMIN — SODIUM BICARBONATE 5 ML: 42 SOLUTION INTRAVENOUS at 11:20

## 2019-07-30 NOTE — PROGRESS NOTES
10:35am patient received for right breast biopsy for mass seen on ultrasound. Procedure explained to patient as well as risks associated with procedure. Post biopsy discharge instructions reviewed with patient as well,. Patient prefers to be contacted by phone with pathology results. 11:30 procedure complete and pressure held to biopsy site for 5 minutes. No bleeding noted . Patient tolerated procedure well. 11:38 dressing dry and intact post breast clip mammogram.  Post biopsy discharge instructions reviewed with patient and patient given a copy. Ice pack applied over transparent dressing and patient discharged.

## 2019-07-31 NOTE — PROGRESS NOTES
Pathology result in and concurred by Dr Denice Moses and faxed to AS[facility] Larsen.   Patient contacted with pathology results and recommendation of yearly mammogram.

## 2019-09-05 NOTE — PERIOP NOTES
[address]  Endoscopy Preprocedure Instructions      1. On the day of your surgery, please report to registration located on the 2nd floor of the  [facility]. yes    2. You must have a responsible adult to drive you to the hospital, stay at the hospital during your procedure and drive you home. If they leave your procedure will not be started (no exceptions). yes    3. Do not have anything to eat or drink (including water, gum, mints, coffee, and juice) after midnight. This does not apply to the medications you were instructed to take by your physician. yesIf you are currently taking Plavix, Coumadin, Aspirin, or other blood-thinning agents, contact your physician for special instructions. not applicable    4. If you are having a procedure that requires bowel prep: We recommend that you have only clear liquids the day before your procedure and begin your bowel prep by 5:00 pm.  You may continue to drink clear liquids until midnight. If for any reason you are not able to complete your prep please notify your physician immediately. yes    5. Have a list of all current medications, including vitamins, herbal supplements and any other over the counter medications. yes    6. If you wear glasses, contacts, dentures and/or hearing aids, they may be removed prior to procedure, please bring a case to store them in. yes    7. You should understand that if you do not follow these instructions your procedure may be cancelled. If your physical condition changes (I.e. fever, cold or flu) please contact your doctor as soon as possible. 8. It is important that you be on time.   If for any reason you are unable to keep your appointment please call )- the day of or your physicians office prior to your scheduled procedure

## 2019-09-09 ENCOUNTER — ANESTHESIA EVENT (OUTPATIENT)
Dept: ENDOSCOPY | Age: 54
End: 2019-09-09
Payer: OTHER GOVERNMENT

## 2019-09-10 ENCOUNTER — HOSPITAL ENCOUNTER (OUTPATIENT)
Age: 54
Setting detail: OUTPATIENT SURGERY
Discharge: HOME | End: 2019-09-10
Attending: INTERNAL MEDICINE | Admitting: INTERNAL MEDICINE
Payer: OTHER GOVERNMENT

## 2019-09-10 ENCOUNTER — ANESTHESIA (OUTPATIENT)
Dept: ENDOSCOPY | Age: 54
End: 2019-09-10
Payer: OTHER GOVERNMENT

## 2019-09-10 VITALS
DIASTOLIC BLOOD PRESSURE: 76 MMHG | SYSTOLIC BLOOD PRESSURE: 115 MMHG | HEIGHT: 66 IN | WEIGHT: 161.6 LBS | BODY MASS INDEX: 25.97 KG/M2 | TEMPERATURE: 97.8 F | OXYGEN SATURATION: 100 % | HEART RATE: 73 BPM | RESPIRATION RATE: 19 BRPM

## 2019-09-10 PROCEDURE — 74011250636 HC RX REV CODE- 250/636: Performed by: NURSE ANESTHETIST, CERTIFIED REGISTERED

## 2019-09-10 PROCEDURE — 76040000019: Performed by: INTERNAL MEDICINE

## 2019-09-10 PROCEDURE — 76060000031 HC ANESTHESIA FIRST 0.5 HR: Performed by: INTERNAL MEDICINE

## 2019-09-10 RX ORDER — SODIUM CHLORIDE 9 MG/ML
INJECTION, SOLUTION INTRAVENOUS
Status: DISCONTINUED | OUTPATIENT
Start: 2019-09-10 | End: 2019-09-10 | Stop reason: HOSPADM

## 2019-09-10 RX ORDER — FLUMAZENIL 0.1 MG/ML
0.2 INJECTION INTRAVENOUS
Status: DISCONTINUED | OUTPATIENT
Start: 2019-09-10 | End: 2019-09-10 | Stop reason: HOSPADM

## 2019-09-10 RX ORDER — NALOXONE HYDROCHLORIDE 0.4 MG/ML
0.4 INJECTION, SOLUTION INTRAMUSCULAR; INTRAVENOUS; SUBCUTANEOUS
Status: DISCONTINUED | OUTPATIENT
Start: 2019-09-10 | End: 2019-09-10 | Stop reason: HOSPADM

## 2019-09-10 RX ORDER — ATROPINE SULFATE 0.1 MG/ML
0.4 INJECTION INTRAVENOUS
Status: DISCONTINUED | OUTPATIENT
Start: 2019-09-10 | End: 2019-09-10 | Stop reason: HOSPADM

## 2019-09-10 RX ORDER — MIDAZOLAM HYDROCHLORIDE 1 MG/ML
.25-5 INJECTION, SOLUTION INTRAMUSCULAR; INTRAVENOUS
Status: DISCONTINUED | OUTPATIENT
Start: 2019-09-10 | End: 2019-09-10 | Stop reason: HOSPADM

## 2019-09-10 RX ORDER — SODIUM CHLORIDE 9 MG/ML
50 INJECTION, SOLUTION INTRAVENOUS CONTINUOUS
Status: DISCONTINUED | OUTPATIENT
Start: 2019-09-10 | End: 2019-09-10 | Stop reason: HOSPADM

## 2019-09-10 RX ORDER — DEXTROMETHORPHAN/PSEUDOEPHED 2.5-7.5/.8
1.2 DROPS ORAL
Status: DISCONTINUED | OUTPATIENT
Start: 2019-09-10 | End: 2019-09-10 | Stop reason: HOSPADM

## 2019-09-10 RX ORDER — PROPOFOL 10 MG/ML
INJECTION, EMULSION INTRAVENOUS AS NEEDED
Status: DISCONTINUED | OUTPATIENT
Start: 2019-09-10 | End: 2019-09-10 | Stop reason: HOSPADM

## 2019-09-10 RX ORDER — PROPOFOL 10 MG/ML
INJECTION, EMULSION INTRAVENOUS
Status: DISCONTINUED | OUTPATIENT
Start: 2019-09-10 | End: 2019-09-10 | Stop reason: HOSPADM

## 2019-09-10 RX ORDER — EPINEPHRINE 0.1 MG/ML
1 INJECTION INTRACARDIAC; INTRAVENOUS
Status: DISCONTINUED | OUTPATIENT
Start: 2019-09-10 | End: 2019-09-10 | Stop reason: HOSPADM

## 2019-09-10 RX ADMIN — PROPOFOL 30 MG: 10 INJECTION, EMULSION INTRAVENOUS at 11:58

## 2019-09-10 RX ADMIN — SODIUM CHLORIDE: 9 INJECTION, SOLUTION INTRAVENOUS at 11:45

## 2019-09-10 RX ADMIN — PROPOFOL 40 MG: 10 INJECTION, EMULSION INTRAVENOUS at 12:02

## 2019-09-10 RX ADMIN — PROPOFOL 80 MG: 10 INJECTION, EMULSION INTRAVENOUS at 11:55

## 2019-09-10 RX ADMIN — PROPOFOL 100 MCG/KG/MIN: 10 INJECTION, EMULSION INTRAVENOUS at 11:55

## 2019-09-10 NOTE — PERIOP NOTES
Received Report From Marium Mireles CRNA @ [phone], see anesthesia notes. Care of the patient transferred to procedure nurse Carlos A Verma RN @ [phone]    Out of Procedure and sent to post-recovery @ [phone]    Post-recovery report given to Tisha Pascal @ [phone]    Patient ABD remains soft and non-tender post procedure. Pt has no complaints at this time and tolerated the procedure well. Endoscope was pre-cleaned at bedside immediately following procedure by [employer] YEE Salinas

## 2019-09-10 NOTE — PROCEDURES
Tammy Kumar M.D.  [phone]            9/10/2019          Colonoscopy Operative Report  Minna Regan  :  1965  [facility] Medical Record Number:  [id number]      Indications:    Screening colonoscopy     :  Ehsan De Leon MD    Referring Provider: Claudio Griffith MD    Sedation:  MAC anesthesia    Pre-Procedural Exam:      Airway: clear,  No airway problems anticipated  Heart: RRR, without gallops or rubs  Lungs: clear bilaterally without wheezes, crackles, or rhonchi  Abdomen: soft, nontender, nondistended, bowel sounds present  Mental Status: awake, alert and oriented to person, place and time     Procedure Details:  After informed consent was obtained with all risks and benefits of procedure explained and preoperative exam completed, the patient was taken to the endoscopy suite and placed in the left lateral decubitus position. Upon sequential sedation as per above, a digital rectal exam was performed. The Olympus videocolonoscope  was inserted in the rectum and carefully advanced to the cecum, which was identified by the ileocecal valve and appendiceal orifice. The quality of preparation was good. The colonoscope was slowly withdrawn with careful inspection and evaluation between folds. Retroflexion in the rectum was performed. Findings:   Terminal Ileum: not intubated  Cecum: normal mucosa, one diminutive angioectasia seen  Ascending Colon: normal  Transverse Colon: normal  Descending Colon: normal  Sigmoid: no mucosal lesion appreciated  mild diverticulosis; Rectum: no mucosal lesion appreciated  Grade 1 internal hemorrhoid(s); Interventions:  none    Specimen Removed:  none    Complications: None. EBL:  None. Impression:  Mild sigmoid diverticulosis and small internal hemorrhoids, otherwise mucosa within normal.    Recommendations:  -Repeat colonoscopy in 10 years   -High fiber diet.    -Resume normal medication(s).      Discharge Disposition:  Home in the company of a  when able to ambulate.     Kelsea Astorga MD  9/10/2019  12:12 PM

## 2019-09-10 NOTE — DISCHARGE INSTRUCTIONS
[address] [facility]. Reno Aragon M.D.  [phone]            COLON DISCHARGE INSTRUCTIONS       9/10/2019    Grace Lam  :  1965  [facility] Medical Record Number:  [id number]      COLONOSCOPY FINDINGS:  Your colonoscopy showed mild diverticulosis and small internal hemorrhoids, otherwise mucosa within normal.    DISCOMFORT:  Redness at IV site- apply warm compress to area; if redness or soreness persist- contact your physician  There may be a slight amount of blood passed from the rectum  Gaseous discomfort- walking, belching will help relieve any discomfort  You may not operate a vehicle for 12 hours  You may not engage in an occupation involving machinery or appliances for rest of today  You may not drink alcoholic beverages for at least 12 hours  Avoid making any critical decisions for at least 24 hour  DIET:   High fiber diet. - however -  remember your colon is empty and a heavy meal will produce gas. Avoid these foods:  vegetables, fried / greasy foods, carbonated drinks for today     ACTIVITY:  You may resume your normal daily activities it is recommended that you spend the remainder of the day resting -  avoid any strenuous activity. CALL M.D. ANY SIGN OF:   Increasing pain, nausea, vomiting  Abdominal distension (swelling)  New increased bleeding (oral or rectal)  Fever (chills)  Pain in chest area  Bloody discharge from nose or mouth   Shortness of breath    Follow-up Instructions:   Call Dr. Hawk Feldman if any questions or problems. Telephone # [phone]    Should have a repeat colonoscopy in 10 years. Patient Education        Learning About Diverticulosis and Diverticulitis  What are diverticulosis and diverticulitis? In diverticulosis and diverticulitis, pouches called diverticula form in the wall of the large intestine, or colon. · In diverticulosis, the pouches do not cause any pain or other symptoms.   · In diverticulitis, the pouches get inflamed or infected and cause symptoms. Doctors aren't sure what causes these pouches in the colon. But they think that a low-fiber diet may play a role. Without fiber to add bulk to the stool, the colon has to work harder than normal to push the stool forward. The pressure from this may cause pouches to form in weak spots along the colon. Some people with diverticulosis get diverticulitis. But experts don't know why this happens. What are the symptoms? · In diverticulosis, most people don't have symptoms. But pouches sometimes bleed. · In diverticulitis, symptoms may last from a few hours to a week or more. They include:  ? Belly pain. This is usually in the lower left side. It is sometimes worse when you move. This is the most common symptom. ? Fever and chills. ? Bloating and gas. ? Diarrhea or constipation. ? Nausea and sometimes vomiting.  ? Not feeling like eating. How can you prevent these problems? You may be able to lower your chance of getting diverticulitis. You can do this by taking steps to prevent constipation. · Eat fruits, vegetables, beans, and whole grains every day. These foods are high in fiber. · Drink plenty of fluids (enough so that your urine is light yellow or clear like water). If you have kidney, heart, or liver disease and have to limit fluids, talk with your doctor before you increase the amount of fluids you drink. · Get at least 30 minutes of exercise on most days of the week. Walking is a good choice. You also may want to do other activities, such as running, swimming, cycling, or playing tennis or team sports. · Take a fiber supplement, such as Citrucel or Metamucil, every day if needed. Read and follow all instructions on the label. · Schedule time each day for a bowel movement. Having a daily routine may help. Take your time and do not strain when having a bowel movement. Some people avoid nuts, seeds, berries, and popcorn.  They believe that these foods might get trapped in the diverticula and cause pain. But there is no proof that these foods cause diverticulitis or make it worse. How are these problems treated? · The best way to treat diverticulosis is to avoid constipation. (See the tips above.)  · Treatment for diverticulitis includes antibiotics and often a change in your diet. You may need only liquids at first. Your doctor may suggest pain medicines for pain or belly cramps. In some cases, surgery may be needed. Follow-up care is a key part of your treatment and safety. Be sure to make and go to all appointments, and call your doctor if you are having problems. It's also a good idea to know your test results and keep a list of the medicines you take. Where can you learn more? Go to http://leela-kiesha.info/. Enter J741 in the search box to learn more about \"Learning About Diverticulosis and Diverticulitis. \"  Current as of: November 7, 2018  Content Version: 12.1  © 7859-9419 [employer]. Care instructions adapted under license by [employer] (which disclaims liability or warranty for this information). If you have questions about a medical condition or this instruction, always ask your healthcare professional. [address] any warranty or liability for your use of this information.

## 2019-09-10 NOTE — ANESTHESIA POSTPROCEDURE EVALUATION
Procedure(s):  COLONOSCOPY. MAC    Anesthesia Post Evaluation      Multimodal analgesia: multimodal analgesia used between 6 hours prior to anesthesia start to PACU discharge  Patient location during evaluation: PACU  Patient participation: complete - patient participated  Level of consciousness: awake and alert  Airway patency: patent  Anesthetic complications: no  Cardiovascular status: acceptable  Respiratory status: acceptable  Hydration status: acceptable        Vitals Value Taken Time   /76 9/10/2019 12:31 PM   Temp     Pulse 73 9/10/2019 12:31 PM   Resp 21 9/10/2019 12:31 PM   SpO2 98 % 9/10/2019 12:33 PM   Vitals shown include unvalidated device data.

## 2019-09-10 NOTE — ROUTINE PROCESS
Dustin Amin  1965  [id number]    Situation:  Verbal report received from: Anu Martino RN  Procedure: Procedure(s):  COLONOSCOPY    Background:    Preoperative diagnosis: COLON SCREENING  Postoperative diagnosis: diverticulosis  small cecum AVM  hemorrhoids    :  Dr. Anil Saucedo  Assistant(s): Endoscopy RN-1: Minetta Bence, RN  Endoscopy RN-2: Amara Estrella RN    Specimens: * No specimens in log *  H. Pylori  no    Assessment:  Intra-procedure medications       Anesthesia gave intra-procedure sedation and medications, see anesthesia flow sheet yes    Intravenous fluids: NS@ KVO     Vital signs stable     Abdominal assessment: round and soft     Recommendation:  Discharge patient per MD order  Family or Friend   Permission to share finding with family or friend yes    Endoscopy discharge instructions have been reviewed and given to patient and parent. The patient and parent verbalized understanding and acceptance of instructions.

## 2019-09-10 NOTE — ANESTHESIA PREPROCEDURE EVALUATION
Relevant Problems   No relevant active problems       Anesthetic History   No history of anesthetic complications            Review of Systems / Medical History  Patient summary reviewed, nursing notes reviewed and pertinent labs reviewed    Pulmonary  Within defined limits                 Neuro/Psych   Within defined limits           Cardiovascular              Hyperlipidemia      Comments: Not on Beta Blocker   GI/Hepatic/Renal  Within defined limits              Endo/Other      Hypothyroidism       Other Findings              Physical Exam    Airway  Mallampati: II  TM Distance: 4 - 6 cm  Neck ROM: normal range of motion   Mouth opening: Normal     Cardiovascular  Regular rate and rhythm,  S1 and S2 normal,  no murmur, click, rub, or gallop  Rhythm: regular  Rate: normal         Dental    Dentition: Caps/crowns     Pulmonary  Breath sounds clear to auscultation               Abdominal  GI exam deferred       Other Findings            Anesthetic Plan    ASA: 2  Anesthesia type: MAC            Anesthetic plan and risks discussed with: Patient

## 2019-09-10 NOTE — H&P
Anna Johnson M.D.  [phone]            History and Physical       NAME:  Lanny Patterson   :   1965   MRN:   [id number]       Referring Physician:  Dr. Tom Carrera Date: 9/10/2019 11:47 AM    Chief Complaint:  Colon cancer screening    History of Present Illness:  Patient is a 48 y.o. who is seen for colon cancer screening. Denies any ongoing GI complaints. PMH:  Past Medical History:   Diagnosis Date    Hypercholesterolemia 3/24/2019    Hypothyroidism     Prediabetes     Thyroid disease        PSH:  Past Surgical History:   Procedure Laterality Date    HX GYN      HX PARTIAL HYSTERECTOMY         Allergies: Allergies   Allergen Reactions    Pcn [Penicillins] Rash       Home Medications:  Prior to Admission Medications   Prescriptions Last Dose Informant Patient Reported? Taking? ARMOUR THYROID 60 mg tablet 9/10/2019 at Unknown time  No Yes   Sig: TAKE 1 TABLET DAILY   Cetirizine (ZYRTEC) 10 mg cap 9/10/2019 at Unknown time  Yes Yes   Sig: Take  by mouth. OTHER,NON-FORMULARY, 2019 at Unknown time  No Yes   Sig: Apply 0.5 mL to affected area two (2) times a day. .5ml= Biest 0.6mg, Progesterone 35mg, testosterone 1 mg(compound cream). Sig: Apply 0.5ml vaginally twice a day. 90 days supply. PROAIR HFA 90 mcg/actuation inhaler Unknown at Unknown time  No No   Sig: USE 2 INHALATIONS EVERY 4 HOURS AS NEEDED FOR WHEEZING   acyclovir (ZOVIRAX) 5 % ointment Not Taking at Unknown time  No No   Sig: Apply  to affected area every three (3) hours. ergocalciferol (VITAMIN D2) 50,000 unit capsule Not Taking at Unknown time  Yes No   Sig: Take 50,000 Units by mouth every seven (7) days.    estradiol (ESTRACE) 0.01 % (0.1 mg/gram) vaginal cream 2019 at Unknown time  No Yes   Sig: apply twice a week      Facility-Administered Medications: None       Hospital Medications:  Current Facility-Administered Medications   Medication Dose Route Frequency    0.9% sodium chloride infusion  50 mL/hr IntraVENous CONTINUOUS    midazolam (VERSED) injection 0.25-5 mg  0.25-5 mg IntraVENous Multiple    naloxone (NARCAN) injection 0.4 mg  0.4 mg IntraVENous Multiple    flumazenil (ROMAZICON) 0.1 mg/mL injection 0.2 mg  0.2 mg IntraVENous Multiple    simethicone (MYLICON) 85FO/2.8JD oral drops 80 mg  1.2 mL Oral Multiple    atropine injection 0.4 mg  0.4 mg IntraVENous ONCE PRN    EPINEPHrine (ADRENALIN) 0.1 mg/mL syringe 1 mg  1 mg Endoscopically ONCE PRN       Social History:  Social History     Tobacco Use    Smoking status: Never Smoker    Smokeless tobacco: Never Used   Substance Use Topics    Alcohol use: Yes     Comment: socially       Family History:  Family History   Problem Relation Age of Onset    Other Father         aortic anuerysm             Review of Systems:      Constitutional: negative fever, negative chills, negative weight loss  Eyes:   negative visual changes  ENT:   negative sore throat, tongue or lip swelling  Respiratory:  negative cough, negative dyspnea  Cards:  negative for chest pain, palpitations, lower extremity edema  GI:   See HPI  :  negative for frequency, dysuria  Integument:  negative for rash and pruritus  Heme:  negative for easy bruising and gum/nose bleeding  Musculoskel: negative for myalgias,  back pain and muscle weakness  Neuro: negative for headaches, dizziness, vertigo  Psych:  negative for feelings of anxiety, depression       Objective:     Patient Vitals for the past 8 hrs:   BP Temp Pulse Resp SpO2 Height Weight   09/10/19 1055 118/83 98.1 °F (36.7 °C) 78 14 100 % 5' 6\" (1.676 m) 73.3 kg (161 lb 9.6 oz)     No intake/output data recorded. No intake/output data recorded.     EXAM:     NEURO-a&o   HEENT-wnl   LUNGS-clear    COR-regular rate and rhythym     ABD-soft , no tenderness, no rebound, good bs     EXT-no edema     Data Review     No results for input(s): WBC, HGB, HCT, PLT, HGBEXT, HCTEXT, PLTEXT in the last 72 hours. No results for input(s): NA, K, CL, CO2, BUN, CREA, GLU, PHOS, CA in the last 72 hours. No results for input(s): SGOT, GPT, AP, TBIL, TP, ALB, GLOB, GGT, AML, LPSE in the last 72 hours. No lab exists for component: AMYP, HLPSE  No results for input(s): INR, PTP, APTT in the last 72 hours. No lab exists for component: INREXT    Patient Active Problem List   Diagnosis Code    Prediabetes R73.03    Hypothyroidism E03.9    Menopause, premature E28.319    Well woman exam with routine gynecological exam Z01.419    Unspecified hypothyroidism E03.9    Screening for breast cancer Z12.31    Hypercholesterolemia E78.00      Assessment:   · Colon cancer screening   Plan:   · Colonoscopy today.      Signed By: Dyan Coker MD     9/10/2019  11:47 AM

## 2019-09-20 DIAGNOSIS — E03.9 ACQUIRED HYPOTHYROIDISM: ICD-10-CM

## 2019-09-20 RX ORDER — SULFAMETHOXAZOLE AND TRIMETHOPRIM 800; 160 MG/1; MG/1
TABLET ORAL
Qty: 90 TAB | Refills: 4 | Status: SHIPPED | OUTPATIENT
Start: 2019-09-20 | End: 2020-12-21 | Stop reason: SDUPTHER

## 2019-10-22 DIAGNOSIS — E28.319 MENOPAUSE, PREMATURE: ICD-10-CM

## 2020-04-06 ENCOUNTER — TELEPHONE (OUTPATIENT)
Dept: FAMILY MEDICINE CLINIC | Age: 55
End: 2020-04-06

## 2020-04-06 NOTE — TELEPHONE ENCOUNTER
----- Message from Ashley Raygoza LPN sent at 1/0/4636  8:40 AM EDT -----  Regarding: FW: Non-Urgent Medical Question  Contact: [phone]    ----- Message -----  From: Biju Palomares  Sent: 4/4/2020   9:39 AM EDT  To: [facility] Nurse Pool  Subject: Non-Urgent Medical Question                      Dr Artur Swanson, I would like to get an antibody blood year for Covid 19 if you feel appropriate. I think I have had the virus last month. All the symptoms- lasted 3 weeks- fortunately not bad enough to require a MD.  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 Let me know if you feel this possible and appropriate. Thanks and stay well!

## 2020-07-03 ENCOUNTER — PATIENT MESSAGE (OUTPATIENT)
Dept: FAMILY MEDICINE CLINIC | Age: 55
End: 2020-07-03

## 2020-07-03 DIAGNOSIS — E28.319 MENOPAUSE, PREMATURE: ICD-10-CM

## 2020-07-06 NOTE — TELEPHONE ENCOUNTER
----- Message from Ceferino Jones LPN sent at 9/0/0057  8:36 AM EDT -----  Regarding: FW: Prescription Question  Contact: [phone]    ----- Message -----  From: Marshall Martin  Sent: 7/3/2020   3:40 PM EDT  To: [facility] Nurse Pool  Subject: Prescription Question                            Hi Dr Cassidy Salazar. I have an appt with you Aug 12th scheduled, can you please refill my hormone cream for 30 days until the appt?  ([place]). Thank you. Happy Independence Day!

## 2020-08-12 ENCOUNTER — HOSPITAL ENCOUNTER (OUTPATIENT)
Dept: LAB | Age: 55
Discharge: HOME | End: 2020-08-12

## 2020-08-12 ENCOUNTER — OFFICE VISIT (OUTPATIENT)
Dept: FAMILY MEDICINE CLINIC | Age: 55
End: 2020-08-12
Payer: OTHER GOVERNMENT

## 2020-08-12 ENCOUNTER — APPOINTMENT (OUTPATIENT)
Dept: FAMILY MEDICINE CLINIC | Age: 55
End: 2020-08-12

## 2020-08-12 VITALS
OXYGEN SATURATION: 100 % | BODY MASS INDEX: 24.46 KG/M2 | TEMPERATURE: 97.9 F | SYSTOLIC BLOOD PRESSURE: 122 MMHG | HEART RATE: 92 BPM | WEIGHT: 152.2 LBS | DIASTOLIC BLOOD PRESSURE: 82 MMHG | RESPIRATION RATE: 20 BRPM | HEIGHT: 66 IN

## 2020-08-12 DIAGNOSIS — E55.9 VITAMIN D DEFICIENCY: ICD-10-CM

## 2020-08-12 DIAGNOSIS — Z13.6 SCREENING, HEART DISEASE, ISCHEMIC: ICD-10-CM

## 2020-08-12 DIAGNOSIS — Z13.9 SCREENING FOR CONDITION: ICD-10-CM

## 2020-08-12 DIAGNOSIS — N95.2 VAGINAL ATROPHY: ICD-10-CM

## 2020-08-12 DIAGNOSIS — Z00.00 ROUTINE ADULT HEALTH MAINTENANCE: Primary | ICD-10-CM

## 2020-08-12 DIAGNOSIS — Z00.00 ROUTINE ADULT HEALTH MAINTENANCE: ICD-10-CM

## 2020-08-12 DIAGNOSIS — E28.319 MENOPAUSE, PREMATURE: ICD-10-CM

## 2020-08-12 LAB
25(OH)D3 SERPL-MCNC: 121.6 NG/ML (ref 30–100)
ALBUMIN SERPL-MCNC: 4.4 G/DL (ref 3.5–5)
ALBUMIN/GLOB SERPL: 1.5 {RATIO} (ref 1.1–2.2)
ALP SERPL-CCNC: 46 U/L (ref 45–117)
ALT SERPL-CCNC: 21 U/L (ref 12–78)
ANION GAP SERPL CALC-SCNC: 9 MMOL/L (ref 5–15)
AST SERPL-CCNC: 13 U/L (ref 15–37)
BASOPHILS # BLD: 0.1 K/UL (ref 0–0.1)
BASOPHILS NFR BLD: 1 % (ref 0–1)
BILIRUB SERPL-MCNC: 0.7 MG/DL (ref 0.2–1)
BUN SERPL-MCNC: 9 MG/DL (ref 6–20)
BUN/CREAT SERPL: 13 (ref 12–20)
CALCIUM SERPL-MCNC: 9.6 MG/DL (ref 8.5–10.1)
CHLORIDE SERPL-SCNC: 101 MMOL/L (ref 97–108)
CHOLEST SERPL-MCNC: 203 MG/DL
CO2 SERPL-SCNC: 28 MMOL/L (ref 21–32)
CREAT SERPL-MCNC: 0.7 MG/DL (ref 0.55–1.02)
DIFFERENTIAL METHOD BLD: NORMAL
EOSINOPHIL # BLD: 0.2 K/UL (ref 0–0.4)
EOSINOPHIL NFR BLD: 3 % (ref 0–7)
ERYTHROCYTE [DISTWIDTH] IN BLOOD BY AUTOMATED COUNT: 13.4 % (ref 11.5–14.5)
EST. AVERAGE GLUCOSE BLD GHB EST-MCNC: 103 MG/DL
GLOBULIN SER CALC-MCNC: 3 G/DL (ref 2–4)
GLUCOSE SERPL-MCNC: 99 MG/DL (ref 65–100)
HBA1C MFR BLD: 5.2 % (ref 4–5.6)
HCT VFR BLD AUTO: 39.9 % (ref 35–47)
HDLC SERPL-MCNC: 92 MG/DL
HDLC SERPL: 2.2 {RATIO} (ref 0–5)
HGB BLD-MCNC: 12.6 G/DL (ref 11.5–16)
IMM GRANULOCYTES # BLD AUTO: 0 K/UL (ref 0–0.04)
IMM GRANULOCYTES NFR BLD AUTO: 0 % (ref 0–0.5)
LDLC SERPL CALC-MCNC: 102.8 MG/DL (ref 0–100)
LIPID PROFILE,FLP: ABNORMAL
LYMPHOCYTES # BLD: 1.9 K/UL (ref 0.8–3.5)
LYMPHOCYTES NFR BLD: 34 % (ref 12–49)
MCH RBC QN AUTO: 31 PG (ref 26–34)
MCHC RBC AUTO-ENTMCNC: 31.6 G/DL (ref 30–36.5)
MCV RBC AUTO: 98 FL (ref 80–99)
MONOCYTES # BLD: 0.5 K/UL (ref 0–1)
MONOCYTES NFR BLD: 8 % (ref 5–13)
NEUTS SEG # BLD: 3.1 K/UL (ref 1.8–8)
NEUTS SEG NFR BLD: 54 % (ref 32–75)
NRBC # BLD: 0 K/UL (ref 0–0.01)
NRBC BLD-RTO: 0 PER 100 WBC
PLATELET # BLD AUTO: 266 K/UL (ref 150–400)
PMV BLD AUTO: 10.2 FL (ref 8.9–12.9)
POTASSIUM SERPL-SCNC: 3.8 MMOL/L (ref 3.5–5.1)
PROT SERPL-MCNC: 7.4 G/DL (ref 6.4–8.2)
RBC # BLD AUTO: 4.07 M/UL (ref 3.8–5.2)
SODIUM SERPL-SCNC: 138 MMOL/L (ref 136–145)
T4 FREE SERPL-MCNC: 1.1 NG/DL (ref 0.8–1.5)
TRIGL SERPL-MCNC: 41 MG/DL (ref ?–150)
TSH SERPL DL<=0.05 MIU/L-ACNC: 1.29 UIU/ML (ref 0.36–3.74)
VLDLC SERPL CALC-MCNC: 8.2 MG/DL
WBC # BLD AUTO: 5.7 K/UL (ref 3.6–11)

## 2020-08-12 PROCEDURE — 99396 PREV VISIT EST AGE 40-64: CPT | Performed by: FAMILY MEDICINE

## 2020-08-12 RX ORDER — ESTRADIOL 0.1 MG/G
CREAM VAGINAL
Qty: 42.5 G | Refills: 5 | Status: CANCELLED | OUTPATIENT
Start: 2020-08-12

## 2020-08-12 NOTE — PROGRESS NOTES
Identified pt with two pt identifiers(name and ). Chief Complaint   Patient presents with    Physical        Health Maintenance Due   Topic    Shingrix Vaccine Age 50> (1 of 2)    PAP AKA CERVICAL CYTOLOGY     A1C test (Diabetic or Prediabetic)     Influenza Age 5 to Adult        Wt Readings from Last 3 Encounters:   20 152 lb 3.2 oz (69 kg)   09/10/19 161 lb 9.6 oz (73.3 kg)   19 163 lb 3.2 oz (74 kg)     Temp Readings from Last 3 Encounters:   20 97.9 °F (36.6 °C) (Oral)   09/10/19 97.8 °F (36.6 °C)   19 97.7 °F (36.5 °C) (Oral)     BP Readings from Last 3 Encounters:   20 122/82   09/10/19 115/76   19 112/80     Pulse Readings from Last 3 Encounters:   20 92   09/10/19 73   19 66         Learning Assessment:  :     Learning Assessment 3/24/2014   PRIMARY LEARNER Patient   HIGHEST LEVEL OF EDUCATION - PRIMARY LEARNER  4 YEARS OF COLLEGE   BARRIERS PRIMARY LEARNER NONE   CO-LEARNER CAREGIVER No   PRIMARY LANGUAGE ENGLISH   LEARNER PREFERENCE PRIMARY LISTENING   ANSWERED BY patient   RELATIONSHIP SELF       Depression Screening:  :     3 most recent PHQ Screens 2020   Little interest or pleasure in doing things Not at all   Feeling down, depressed, irritable, or hopeless Not at all   Total Score PHQ 2 0       Fall Risk Assessment:  :     Fall Risk Assessment, last 12 mths 7/10/2017   Able to walk? Yes   Fall in past 12 months? No       Abuse Screening:  :     Abuse Screening Questionnaire 2020 2019 2016 3/24/2014   Do you ever feel afraid of your partner? N N N N   Are you in a relationship with someone who physically or mentally threatens you? N N N N   Is it safe for you to go home?  Y Y Y Y       Coordination of Care Questionnaire:  :     1) Have you been to an emergency room, urgent care clinic since your last visit? no   Hospitalized since your last visit? no             2) Have you seen or consulted any other health care providers outside of [address] [place] [address] since your last visit? no  (Include any pap smears or colon screenings in this section.)    3) Do you have an Advance Directive on file? no  Are you interested in receiving information about Advance Directives? no    Reviewed record in preparation for visit and have obtained necessary documentation. Medication reconciliation up to date and corrected with patient at this time.

## 2020-08-12 NOTE — PATIENT INSTRUCTIONS
Well Visit, Women 48 to 72: Care Instructions Your Care Instructions Physical exams can help you stay healthy. Your doctor has checked your overall health and may have suggested ways to take good care of yourself. He or she also may have recommended tests. At home, you can help prevent illness with healthy eating, regular exercise, and other steps. Follow-up care is a key part of your treatment and safety. Be sure to make and go to all appointments, and call your doctor if you are having problems. It's also a good idea to know your test results and keep a list of the medicines you take. How can you care for yourself at home? · Reach and stay at a healthy weight. This will lower your risk for many problems, such as obesity, diabetes, heart disease, and high blood pressure. · Get at least 30 minutes of exercise on most days of the week. Walking is a good choice. You also may want to do other activities, such as running, swimming, cycling, or playing tennis or team sports. · Do not smoke. Smoking can make health problems worse. If you need help quitting, talk to your doctor about stop-smoking programs and medicines. These can increase your chances of quitting for good. · Protect your skin from too much sun. When you're outdoors from 10 a.m. to 4 p.m., stay in the shade or cover up with clothing and a hat with a wide brim. Wear sunglasses that block UV rays. Even when it's cloudy, put broad-spectrum sunscreen (SPF 30 or higher) on any exposed skin. · See a dentist one or two times a year for checkups and to have your teeth cleaned. · Wear a seat belt in the car. Follow your doctor's advice about when to have certain tests. These tests can spot problems early. · Cholesterol. Your doctor will tell you how often to have this done based on your age, family history, or other things that can increase your risk for heart attack and stroke. · Blood pressure. Have your blood pressure checked during a routine doctor visit. Your doctor will tell you how often to check your blood pressure based on your age, your blood pressure results, and other factors. · Mammogram. Ask your doctor how often you should have a mammogram, which is an X-ray of your breasts. A mammogram can spot breast cancer before it can be felt and when it is easiest to treat. · Pap test and pelvic exam. Ask your doctor how often you should have a Pap test. You may not need to have a Pap test as often as you used to. · Vision. Have your eyes checked every year or two or as often as your doctor suggests. Some experts recommend that you have yearly exams for glaucoma and other age-related eye problems starting at age 48. · Hearing. Tell your doctor if you notice any change in your hearing. You can have tests to find out how well you hear. · Diabetes. Ask your doctor whether you should have tests for diabetes. · Colorectal cancer. Your risk for colorectal cancer gets higher as you get older. Some experts say that adults should start regular screening at age 48 and stop at age 76. Others say to start before age 48 or continue after age 76. Talk with your doctor about your risk and when to start and stop screening. · Thyroid disease. Talk to your doctor about whether to have your thyroid checked as part of a regular physical exam. Women have an increased chance of a thyroid problem. · Osteoporosis. You should begin tests for bone density at age 72. If you are younger than 72, ask your doctor whether you have factors that may increase your risk for this disease. You may want to have this test before age 72. · Heart attack and stroke risk. At least every 4 to 6 years, you should have your risk for heart attack and stroke assessed.  Your doctor uses factors such as your age, blood pressure, cholesterol, and whether you smoke or have diabetes to show what your risk for a heart attack or stroke is over the next 10 years. When should you call for help? Watch closely for changes in your health, and be sure to contact your doctor if you have any problems or symptoms that concern you. Where can you learn more? Go to http://www.gray.com/ Enter X574 in the search box to learn more about \"Well Visit, Women 50 to 72: Care Instructions. \" Current as of: August 22, 2019               Content Version: 12.5 © 3787-6144 [employer]. Care instructions adapted under license by [employer] (which disclaims liability or warranty for this information). If you have questions about a medical condition or this instruction, always ask your healthcare professional. [address] any warranty or liability for your use of this information.

## 2020-08-12 NOTE — PROGRESS NOTES
Subjective:   48 y.o. female for Well Woman Check. Her gyne and breast care is done elsewhere by her Ob-Gyne physician - done in [place]. Patient Active Problem List    Diagnosis Date Noted    Hypercholesterolemia 03/24/2019    Unspecified hypothyroidism 09/30/2015    Menopause, premature 10/10/2011    Prediabetes     Hypothyroidism      Current Outpatient Medications   Medication Sig Dispense Refill    OTHER,NON-FORMULARY, Apply 0.5 mL to affected area two (2) times a day. .5ml= Biest 0.6mg, Progesterone 35mg, testosterone 1 mg(compound cream). Sig: Apply 0.5ml vaginally twice a day. 30 days supply. 1 Each 5    zinc sulfate (ZINC-15 PO) Take  by mouth daily.  ARMOUR THYROID 60 mg tablet TAKE 1 TABLET DAILY 90 Tab 4    estradiol (ESTRACE) 0.01 % (0.1 mg/gram) vaginal cream apply twice a week 42.5 g 5    Cetirizine (ZYRTEC) 10 mg cap Take  by mouth.  ergocalciferol (VITAMIN D2) 50,000 unit capsule Take 50,000 Units by mouth every seven (7) days.  PROAIR HFA 90 mcg/actuation inhaler USE 2 INHALATIONS EVERY 4 HOURS AS NEEDED FOR WHEEZING 25.5 Inhaler 1    acyclovir (ZOVIRAX) 5 % ointment Apply  to affected area every three (3) hours. 15 g 2     Allergies   Allergen Reactions    Pcn [Penicillins] Rash     Past Medical History:   Diagnosis Date    Hypercholesterolemia 3/24/2019    Hypothyroidism     Prediabetes     Thyroid disease      Past Surgical History:   Procedure Laterality Date    COLONOSCOPY N/A 9/10/2019    COLON[facility] Wale [facility] at [facility]    HX GYN      HX PARTIAL HYSTERECTOMY  2003     Family History   Problem Relation Age of Onset    Other Father         aortic anuerysm     Social History     Tobacco Use    Smoking status: Never Smoker    Smokeless tobacco: Never Used   Substance Use Topics    Alcohol use: Yes     Comment: socially             Specific concerns today: request cardiac CT as screen for CAD.   Also request check COVID AB due to illness that occurred in jan 2020. Review of Systems  Pertinent items are noted in HPI. Objective:   Blood pressure 122/82, pulse 92, temperature 97.9 °F (36.6 °C), temperature source Oral, resp. rate 20, height 5' 6\" (1.676 m), weight 152 lb 3.2 oz (69 kg), SpO2 100 %. Physical Examination:   General appearance - alert, well appearing, and in no distress  Mental status - alert, oriented to person, place, and time  Ears - bilateral TM's and external ear canals normal  Nose - normal and patent, no erythema, discharge or polyps  Mouth - mucous membranes moist, pharynx normal without lesions  Neck - supple, no significant adenopathy  Chest - clear to auscultation, no wheezes, rales or rhonchi, symmetric air entry  Heart - normal rate, regular rhythm, normal S1, S2, no murmurs, rubs, clicks or gallops  Abdomen - soft, nontender, nondistended, no masses or organomegaly  Neurological - alert, oriented, normal speech, no focal findings or movement disorder noted  Musculoskeletal - no joint tenderness, deformity or swelling     Assessment/Plan:   Well woman  increase physical activity, follow low fat diet, follow low salt diet, routine labs ordered    ICD-10-CM ICD-9-CM    1. Routine adult health maintenance  Z00.00 V70.0 CBC WITH AUTOMATED DIFF      METABOLIC PANEL, COMPREHENSIVE      LIPID PANEL      TSH 3RD GENERATION      T4, FREE      HEMOGLOBIN A1C WITH EAG   2. Vaginal atrophy  N95.2 627.3    3.  Menopause, premature  E28.319 256.31 OTHER,NON-FORMULARY,   4. Screening, heart disease, ischemic  Z13.6 V81.0 CT HEART W/O CONT WITH CALCIUM   5. Vitamin D deficiency  E55.9 268.9 VITAMIN D, 25 HYDROXY   6. Screening for condition  Z13.9 V82.9 SARS-COV-2 AB, IGG

## 2020-08-13 LAB — SARS-COV-2 AB, IGG, CORG1M: NEGATIVE

## 2020-08-13 NOTE — PROGRESS NOTES
Labs show very high Vit D. Please decrease dose of Vit D you are taking to half. Normal blood cell counts. Normal sugar, kidney, liver, and thyroid tests. A1C is normal range.

## 2020-08-31 ENCOUNTER — TELEPHONE (OUTPATIENT)
Dept: FAMILY MEDICINE CLINIC | Age: 55
End: 2020-08-31

## 2020-08-31 NOTE — TELEPHONE ENCOUNTER
----- Message from Nilay Herr LPN sent at 1/68/7959  1:57 PM EDT -----  Regarding: FW: Prescription Question  Contact: [phone]    ----- Message -----  From: Pritesh Mcgowan  Sent: 8/31/2020  11:59 AM EDT  To: [facility] Nurse Pool  Subject: Prescription Question                            Dr Alysa Boyd renewed my progesterone/estrogen/testosterone prescription but only for one month at a time. I would like a 90 day supply as I rarely go to [facility] and it has to be compounded. Can you notify [facility] to fill for 90 days? Current RX number is [id number]. I can be reached at [phone].    Thanks

## 2020-08-31 NOTE — TELEPHONE ENCOUNTER
I called [facility] and called in 90 days supply for her compounded hormone cream.  Per pharmacist this is 90 g x 3 refills.

## 2020-09-23 ENCOUNTER — HOSPITAL ENCOUNTER (OUTPATIENT)
Dept: CT IMAGING | Age: 55
Discharge: HOME | End: 2020-09-23
Attending: FAMILY MEDICINE
Payer: SELF-PAY

## 2020-09-23 DIAGNOSIS — Z13.6 SCREENING, HEART DISEASE, ISCHEMIC: ICD-10-CM

## 2020-09-23 PROCEDURE — 75571 CT HRT W/O DYE W/CA TEST: CPT

## 2020-12-21 DIAGNOSIS — E03.9 ACQUIRED HYPOTHYROIDISM: ICD-10-CM

## 2020-12-21 RX ORDER — LEVOTHYROXINE AND LIOTHYRONINE 38; 9 UG/1; UG/1
60 TABLET ORAL DAILY
Qty: 90 TAB | Refills: 0 | Status: SHIPPED | OUTPATIENT
Start: 2020-12-21 | End: 2021-02-22 | Stop reason: SDUPTHER

## 2021-02-22 DIAGNOSIS — E03.9 ACQUIRED HYPOTHYROIDISM: ICD-10-CM

## 2021-02-22 RX ORDER — LEVOTHYROXINE AND LIOTHYRONINE 38; 9 UG/1; UG/1
60 TABLET ORAL DAILY
Qty: 90 TAB | Refills: 0 | Status: SHIPPED | OUTPATIENT
Start: 2021-02-22 | End: 2021-02-23

## 2021-02-23 DIAGNOSIS — E03.9 ACQUIRED HYPOTHYROIDISM: ICD-10-CM

## 2021-02-23 RX ORDER — SULFAMETHOXAZOLE AND TRIMETHOPRIM 800; 160 MG/1; MG/1
TABLET ORAL
Qty: 90 TAB | Refills: 3 | Status: SHIPPED | OUTPATIENT
Start: 2021-02-23 | End: 2021-09-30 | Stop reason: SDUPTHER

## 2021-03-09 DIAGNOSIS — E28.319 MENOPAUSE, PREMATURE: ICD-10-CM

## 2021-03-12 ENCOUNTER — TELEPHONE (OUTPATIENT)
Dept: FAMILY MEDICINE CLINIC | Age: 56
End: 2021-03-12

## 2021-03-12 NOTE — TELEPHONE ENCOUNTER
----- Message from Carol Ritter LPN sent at 6/65/1234 12:20 PM EST -----  Regarding: FW: Prescription Question  Contact: [phone]    ----- Message -----  From: Arcelia Pascal  Sent: 3/12/2021  10:30 AM EST  To: [facility] Nurse Pool  Subject: Prescription Question                            Good morning Dr. Nancy Burns,  Can you renew my hormones ([facility]) for 90 days instead of 30. It is hard for me to get there every month since I work out of town. If I need to schedule an appt please let me know. Thank you.   Arcelia Pascal

## 2021-03-25 DIAGNOSIS — L98.9 SKIN LESIONS: Primary | ICD-10-CM

## 2021-05-17 DIAGNOSIS — N95.2 VAGINAL ATROPHY: ICD-10-CM

## 2021-05-18 RX ORDER — ESTRADIOL 0.1 MG/G
CREAM VAGINAL
Qty: 42.5 G | Refills: 5 | Status: SHIPPED | OUTPATIENT
Start: 2021-05-18 | End: 2021-09-30 | Stop reason: SDUPTHER

## 2021-08-03 PROBLEM — E03.9 HYPOTHYROIDISM: Status: RESOLVED | Noted: 2021-08-03 | Resolved: 2021-08-03

## 2021-09-30 ENCOUNTER — TELEPHONE (OUTPATIENT)
Dept: FAMILY MEDICINE CLINIC | Age: 56
End: 2021-09-30

## 2021-09-30 ENCOUNTER — OFFICE VISIT (OUTPATIENT)
Dept: FAMILY MEDICINE CLINIC | Age: 56
End: 2021-09-30
Payer: OTHER GOVERNMENT

## 2021-09-30 VITALS
RESPIRATION RATE: 18 BRPM | HEART RATE: 99 BPM | DIASTOLIC BLOOD PRESSURE: 80 MMHG | BODY MASS INDEX: 27.32 KG/M2 | SYSTOLIC BLOOD PRESSURE: 120 MMHG | HEIGHT: 65 IN | TEMPERATURE: 98.8 F | OXYGEN SATURATION: 97 % | WEIGHT: 164 LBS

## 2021-09-30 DIAGNOSIS — E03.9 ACQUIRED HYPOTHYROIDISM: ICD-10-CM

## 2021-09-30 DIAGNOSIS — Z00.00 PHYSICAL EXAM: Primary | ICD-10-CM

## 2021-09-30 DIAGNOSIS — N95.2 VAGINAL ATROPHY: ICD-10-CM

## 2021-09-30 DIAGNOSIS — Z11.52 ENCOUNTER FOR SCREENING FOR COVID-19: ICD-10-CM

## 2021-09-30 LAB
ALBUMIN SERPL-MCNC: 4 G/DL (ref 3.5–5)
ALBUMIN/GLOB SERPL: 1.1 {RATIO} (ref 1.1–2.2)
ALP SERPL-CCNC: 48 U/L (ref 45–117)
ALT SERPL-CCNC: 25 U/L (ref 12–78)
ANION GAP SERPL CALC-SCNC: 6 MMOL/L (ref 5–15)
AST SERPL-CCNC: 16 U/L (ref 15–37)
BILIRUB SERPL-MCNC: 0.3 MG/DL (ref 0.2–1)
BUN SERPL-MCNC: 23 MG/DL (ref 6–20)
BUN/CREAT SERPL: 29 (ref 12–20)
CALCIUM SERPL-MCNC: 9.4 MG/DL (ref 8.5–10.1)
CHLORIDE SERPL-SCNC: 105 MMOL/L (ref 97–108)
CO2 SERPL-SCNC: 28 MMOL/L (ref 21–32)
CREAT SERPL-MCNC: 0.8 MG/DL (ref 0.55–1.02)
ERYTHROCYTE [DISTWIDTH] IN BLOOD BY AUTOMATED COUNT: 13.2 % (ref 11.5–14.5)
GLOBULIN SER CALC-MCNC: 3.5 G/DL (ref 2–4)
GLUCOSE SERPL-MCNC: 104 MG/DL (ref 65–100)
HCT VFR BLD AUTO: 39.9 % (ref 35–47)
HGB BLD-MCNC: 12.9 G/DL (ref 11.5–16)
MCH RBC QN AUTO: 31.9 PG (ref 26–34)
MCHC RBC AUTO-ENTMCNC: 32.3 G/DL (ref 30–36.5)
MCV RBC AUTO: 98.5 FL (ref 80–99)
NRBC # BLD: 0 K/UL (ref 0–0.01)
NRBC BLD-RTO: 0 PER 100 WBC
PLATELET # BLD AUTO: 301 K/UL (ref 150–400)
PMV BLD AUTO: 10 FL (ref 8.9–12.9)
POTASSIUM SERPL-SCNC: 4.1 MMOL/L (ref 3.5–5.1)
PROT SERPL-MCNC: 7.5 G/DL (ref 6.4–8.2)
RBC # BLD AUTO: 4.05 M/UL (ref 3.8–5.2)
SODIUM SERPL-SCNC: 139 MMOL/L (ref 136–145)
WBC # BLD AUTO: 7.5 K/UL (ref 3.6–11)

## 2021-09-30 PROCEDURE — 99396 PREV VISIT EST AGE 40-64: CPT | Performed by: NURSE PRACTITIONER

## 2021-09-30 RX ORDER — LEVOTHYROXINE AND LIOTHYRONINE 38; 9 UG/1; UG/1
TABLET ORAL
Qty: 90 TABLET | Refills: 1 | Status: SHIPPED | OUTPATIENT
Start: 2021-09-30 | End: 2022-01-21 | Stop reason: SDUPTHER

## 2021-09-30 RX ORDER — ESTRADIOL 0.1 MG/G
CREAM VAGINAL
Qty: 42.5 G | Refills: 5 | Status: SHIPPED | OUTPATIENT
Start: 2021-09-30

## 2021-09-30 NOTE — TELEPHONE ENCOUNTER
Referral was submitted.  It did pend for review through [facility]

## 2021-09-30 NOTE — PROGRESS NOTES
Subjective:   50 y.o. female for Well Woman Check. Her gyne and breast care is done elsewhere by her Ob-Gyne physician. Patient Active Problem List    Diagnosis Date Noted    Hypercholesterolemia 03/24/2019    Unspecified hypothyroidism 09/30/2015    Menopause, premature 10/10/2011    Prediabetes      Current Outpatient Medications   Medication Sig Dispense Refill    thyroid, Pork, ([place] Thyroid) 60 mg tablet TAKE 1 TABLET DAILY 90 Tablet 1    estradioL (ESTRACE) 0.01 % (0.1 mg/gram) vaginal cream apply twice a week 42.5 g 5    OTHER,NON-FORMULARY, Apply 0.5 mL to affected area two (2) times a day. .5ml= Biest 0.6mg, Progesterone 35mg, testosterone 1 mg(compound cream). Sig: Apply 0.5ml vaginally twice a day. 30 days supply. 1 Each 5    zinc sulfate (ZINC-15 PO) Take  by mouth daily.  PROAIR HFA 90 mcg/actuation inhaler USE 2 INHALATIONS EVERY 4 HOURS AS NEEDED FOR WHEEZING 25.5 Inhaler 1    acyclovir (ZOVIRAX) 5 % ointment Apply  to affected area every three (3) hours. 15 g 2    Cetirizine (ZYRTEC) 10 mg cap Take  by mouth.  ergocalciferol (VITAMIN D2) 50,000 unit capsule Take 50,000 Units by mouth every seven (7) days.        Allergies   Allergen Reactions    Pcn [Penicillins] Rash     Past Medical History:   Diagnosis Date    Hypercholesterolemia 3/24/2019    Hypothyroidism     Prediabetes     Thyroid disease      Past Surgical History:   Procedure Laterality Date    COLONOSCOPY N/A 9/10/2019    COLONOSCOPY performed by Joyce Lopez MD at [facility] ENDOSCOPY    HX GYN      HX PARTIAL HYSTERECTOMY  2003     Family History   Problem Relation Age of Onset    Other Father         aortic anuerysm     Social History     Tobacco Use    Smoking status: Never Smoker    Smokeless tobacco: Never Used   Substance Use Topics    Alcohol use: Yes     Comment: socially        Lab Results   Component Value Date/Time    WBC 5.7 08/12/2020 04:06 PM    HGB 12.6 08/12/2020 04:06 PM    HCT 39.9 08/12/2020 04:06 PM    PLATELET 166 55/43/5648 04:06 PM    MCV 98.0 08/12/2020 04:06 PM     Lab Results   Component Value Date/Time    Cholesterol, total 203 (H) 08/12/2020 04:06 PM    HDL Cholesterol 92 08/12/2020 04:06 PM    LDL, calculated 102.8 (H) 08/12/2020 04:06 PM    Triglyceride 41 08/12/2020 04:06 PM    CHOL/HDL Ratio 2.2 08/12/2020 04:06 PM     Lab Results   Component Value Date/Time    GFR est non-AA >60 08/12/2020 04:06 PM    GFR est AA >60 08/12/2020 04:06 PM    Creatinine 0.70 08/12/2020 04:06 PM    BUN 9 08/12/2020 04:06 PM    Sodium 138 08/12/2020 04:06 PM    Potassium 3.8 08/12/2020 04:06 PM    Chloride 101 08/12/2020 04:06 PM    CO2 28 08/12/2020 04:06 PM     Lab Results   Component Value Date/Time    TSH 1.29 08/12/2020 04:06 PM    T4, Free 1.1 08/12/2020 04:06 PM         ROS: Feeling generally well. No TIA's or unusual headaches, no dysphagia. No prolonged cough. No dyspnea or chest pain on exertion. No abdominal pain, change in bowel habits, black or bloody stools. No urinary tract symptoms. No new or unusual musculoskeletal symptoms. Specific concerns today: Pt is here today for refills. Requesting COVID ab testing. .    Objective: The patient appears well, alert, oriented x 3, in no distress. Visit Vitals  /80 (BP 1 Location: Right arm, BP Patient Position: Sitting, BP Cuff Size: Adult)   Pulse 99   Temp 98.8 °F (37.1 °C) (Temporal)   Resp 18   Ht 5' 5\" (1.651 m)   Wt 164 lb (74.4 kg)   SpO2 97%   BMI 27.29 kg/m²     ENT normal.  Neck supple. No adenopathy or thyromegaly. ANNAMARIE. Lungs are clear, good air entry, no wheezes, rhonchi or rales. S1 and S2 normal, no murmurs, regular rate and rhythm. Abdomen soft without tenderness, guarding, mass or organomegaly. Extremities show no edema, normal peripheral pulses. Neurological is normal, no focal findings. Breast and Pelvic exams are deferred.     Assessment/Plan:   Well Woman  routine labs ordered, call if any problems ICD-10-CM ICD-9-CM    1. Physical exam  Z00.00 V70.9 CBC W/O DIFF      METABOLIC PANEL, COMPREHENSIVE   2. Encounter for screening for COVID-19  Z11.52 V73.89 SARS-COV-2 AB, IGG   3. Acquired hypothyroidism  E03.9 244.9 THYROID CASCADE PROFILE      thyroid, Pork, ([place] Thyroid) 60 mg tablet   4. Vaginal atrophy  N95.2 627.3 estradioL (ESTRACE) 0.01 % (0.1 mg/gram) vaginal cream     Will notify when labs return    Pt informed to return to office with worsening of symptoms, or PRN with any questions or concerns. Pt verbalizes understanding of plan of care and denies further questions or concerns at this time.

## 2021-09-30 NOTE — PROGRESS NOTES
Identified pt with two pt identifiers(name and ). Chief Complaint   Patient presents with    Physical    Abnormal Lab Results        Health Maintenance Due   Topic    Hepatitis C Screening     COVID-19 Vaccine (1)    Shingrix Vaccine Age 49> (1 of 2)    Breast Cancer Screen Mammogram     A1C test (Diabetic or Prediabetic)     Flu Vaccine (1)       Wt Readings from Last 3 Encounters:   21 164 lb (74.4 kg)   20 152 lb 3.2 oz (69 kg)   09/10/19 161 lb 9.6 oz (73.3 kg)     Temp Readings from Last 3 Encounters:   21 98.8 °F (37.1 °C) (Temporal)   20 97.9 °F (36.6 °C) (Oral)   09/10/19 97.8 °F (36.6 °C)     BP Readings from Last 3 Encounters:   21 120/80   20 122/82   09/10/19 115/76     Pulse Readings from Last 3 Encounters:   21 99   20 92   09/10/19 73         Learning Assessment:  :     Learning Assessment 3/24/2014   PRIMARY LEARNER Patient   HIGHEST LEVEL OF EDUCATION - PRIMARY LEARNER  4 YEARS OF COLLEGE   BARRIERS PRIMARY LEARNER NONE   CO-LEARNER CAREGIVER No   PRIMARY LANGUAGE ENGLISH   LEARNER PREFERENCE PRIMARY LISTENING   ANSWERED BY patient   RELATIONSHIP SELF       Depression Screening:  :     3 most recent PHQ Screens 2021   Little interest or pleasure in doing things Not at all   Feeling down, depressed, irritable, or hopeless Not at all   Total Score PHQ 2 0       Fall Risk Assessment:  :     Fall Risk Assessment, last 12 mths 7/10/2017   Able to walk? Yes   Fall in past 12 months? No       Abuse Screening:  :     Abuse Screening Questionnaire 2021 2020 2019 2016 3/24/2014   Do you ever feel afraid of your partner? N N N N N   Are you in a relationship with someone who physically or mentally threatens you? N N N N N   Is it safe for you to go home?  Y Y Y Y Y       Coordination of Care Questionnaire:  :     1) Have you been to an emergency room, urgent care clinic since your last visit? no   Hospitalized since your last visit? no             2) Have you seen or consulted any other health care providers outside of [address] [place] [address] since your last visit? no  (Include any pap smears or colon screenings in this section.)    3) Do you have an Advance Directive on file? no  Are you interested in receiving information about Advance Directives? no    Reviewed record in preparation for visit and have obtained necessary documentation.

## 2021-10-01 ENCOUNTER — TELEPHONE (OUTPATIENT)
Dept: FAMILY MEDICINE CLINIC | Age: 56
End: 2021-10-01

## 2021-10-01 LAB — DIASORIN SARS-COV-2 AB, IGG, RCVG3T: NEGATIVE

## 2021-10-01 NOTE — TELEPHONE ENCOUNTER
----- Message from Chidi Solo NP sent at 10/1/2021  7:48 AM EDT -----  Please call patient and let her know that her CBC and CMP returned stable.   Will notify when thyroid levels returnThanks

## 2021-10-01 NOTE — PROGRESS NOTES
Please call patient and let her know that her CBC and CMP returned stable.   Will notify when thyroid levels returnThanks

## 2021-10-01 NOTE — TELEPHONE ENCOUNTER
----- Message from Joselyn Gonsalez NP sent at 10/1/2021  2:04 PM EDT -----  Please call patient and let her know that her covid ab returned and is negative  Thanks

## 2021-10-02 LAB — TSH SERPL-ACNC: 1.28 UIU/ML (ref 0.45–4.5)

## 2021-10-04 ENCOUNTER — TELEPHONE (OUTPATIENT)
Dept: FAMILY MEDICINE CLINIC | Age: 56
End: 2021-10-04

## 2021-10-04 NOTE — TELEPHONE ENCOUNTER
----- Message from Sylvie Claude, NP sent at 10/4/2021  7:46 AM EDT -----  Please call patient and let her know that her thyroid levels returned and are stable  Thanks

## 2021-10-08 DIAGNOSIS — E28.319 MENOPAUSE, PREMATURE: Primary | ICD-10-CM

## 2021-10-08 RX ORDER — ESTRADIOL MICRONIZED 100 %
POWDER (GRAM) MISCELLANEOUS
Qty: 90 G | Refills: 5 | Status: SHIPPED | OUTPATIENT
Start: 2021-10-08 | End: 2022-11-02 | Stop reason: ALTCHOICE

## 2021-10-11 DIAGNOSIS — E28.319 MENOPAUSE, PREMATURE: Primary | ICD-10-CM

## 2021-10-11 RX ORDER — ESTRADIOL MICRONIZED 100 %
POWDER (GRAM) MISCELLANEOUS
Qty: 90 G | Refills: 3 | Status: SHIPPED | OUTPATIENT
Start: 2021-10-11 | End: 2022-05-09 | Stop reason: SDUPTHER

## 2022-01-21 ENCOUNTER — TELEPHONE (OUTPATIENT)
Dept: FAMILY MEDICINE CLINIC | Age: 57
End: 2022-01-21

## 2022-01-21 DIAGNOSIS — E03.9 ACQUIRED HYPOTHYROIDISM: ICD-10-CM

## 2022-01-21 DIAGNOSIS — Z12.31 ENCOUNTER FOR SCREENING MAMMOGRAM FOR MALIGNANT NEOPLASM OF BREAST: Primary | ICD-10-CM

## 2022-01-21 DIAGNOSIS — R06.2 WHEEZING: ICD-10-CM

## 2022-01-21 DIAGNOSIS — B00.1 HERPES LABIALIS: ICD-10-CM

## 2022-01-24 RX ORDER — LEVOTHYROXINE AND LIOTHYRONINE 38; 9 UG/1; UG/1
TABLET ORAL
Qty: 90 TABLET | Refills: 1 | Status: SHIPPED | OUTPATIENT
Start: 2022-01-24 | End: 2022-07-25 | Stop reason: SDUPTHER

## 2022-01-24 RX ORDER — ACYCLOVIR 50 MG/G
OINTMENT TOPICAL
Qty: 15 G | Refills: 2 | Status: SHIPPED | OUTPATIENT
Start: 2022-01-24

## 2022-01-24 RX ORDER — ALBUTEROL SULFATE 90 UG/1
2 AEROSOL, METERED RESPIRATORY (INHALATION)
Qty: 25.5 G | Refills: 0 | Status: SHIPPED | OUTPATIENT
Start: 2022-01-24

## 2022-03-18 PROBLEM — E78.00 HYPERCHOLESTEROLEMIA: Status: ACTIVE | Noted: 2019-03-24

## 2022-05-10 ENCOUNTER — PATIENT MESSAGE (OUTPATIENT)
Dept: FAMILY MEDICINE CLINIC | Age: 57
End: 2022-05-10

## 2022-05-16 ENCOUNTER — TELEPHONE (OUTPATIENT)
Dept: FAMILY MEDICINE CLINIC | Age: 57
End: 2022-05-16

## 2022-05-16 DIAGNOSIS — E28.319 MENOPAUSE, PREMATURE: ICD-10-CM

## 2022-05-16 DIAGNOSIS — Z78.0 MENOPAUSE: Primary | ICD-10-CM

## 2022-05-16 RX ORDER — ESTRADIOL MICRONIZED 100 %
POWDER (GRAM) MISCELLANEOUS
Qty: 90 G | Refills: 0 | Status: SHIPPED | OUTPATIENT
Start: 2022-05-16 | End: 2022-11-02 | Stop reason: ALTCHOICE

## 2022-05-16 NOTE — TELEPHONE ENCOUNTER
Can you resend to pharmacy is failed in transmission    Estriol Micronized 100 % powd, Estradiol Micronized (Bulk) 100 % powd, Progesterone Micronized (Bulk) 100 % powd, testosterone micronized (bulk) 100 % powd 90 g 0 5/10/2022     Sig: APPLY 5.7MR (10 CLICKS) TO AFFECTED AREA (VAGINALLY) 2 TIMES A DAY    Sent to pharmacy as: estradiol micronized (bulk) 100 % powder    E-Prescribing Status: Transmission to pharmacy failed (5/10/2022  9:21 AM EDT)

## 2022-05-16 NOTE — TELEPHONE ENCOUNTER
Willy Wilde, sorry I have not seen her since 2020. I think this RX needs to be called in by [employer] because it is too long. It won't e-scribe.

## 2022-05-17 RX ORDER — ESTRADIOL MICRONIZED 100 %
POWDER (GRAM) MISCELLANEOUS
Qty: 90 G | Refills: 0 | Status: SHIPPED | OUTPATIENT
Start: 2022-05-17 | End: 2022-07-25 | Stop reason: SDUPTHER

## 2022-05-20 DIAGNOSIS — Z12.83 SCREENING FOR SKIN CANCER: Primary | ICD-10-CM

## 2022-06-21 ENCOUNTER — HOSPITAL ENCOUNTER (OUTPATIENT)
Dept: MAMMOGRAPHY | Age: 57
Discharge: HOME | End: 2022-06-21
Attending: FAMILY MEDICINE
Payer: OTHER GOVERNMENT

## 2022-06-21 DIAGNOSIS — Z12.31 ENCOUNTER FOR SCREENING MAMMOGRAM FOR MALIGNANT NEOPLASM OF BREAST: ICD-10-CM

## 2022-06-21 PROCEDURE — 77067 SCR MAMMO BI INCL CAD: CPT

## 2022-07-26 ENCOUNTER — TELEPHONE (OUTPATIENT)
Dept: FAMILY MEDICINE CLINIC | Age: 57
End: 2022-07-26

## 2022-07-26 NOTE — TELEPHONE ENCOUNTER
Dr Steve Medrano is the below message ok for pt to have refill done since she made an appt or does she need to come in earlier?    ----- Message from [employer] sent at 7/26/2022  7:41 AM EDT -----  Regarding: FW: Medication refill     ----- Message -----  From: Tonya Gallardo  Sent: 7/26/2022   2:54 AM EDT  To: [facility] Nurse Pool  Subject: Medication refill                                Good morning. I received a phone message I need to be seen before my medication can be refilled . I scheduled for 10/5/22 with Dr Steve Medrano. My annual visit was with you on 9/30/21 and insurance covers an annual physical.  I wanted to let you know I do have an appt scheduled and why it isnt sooner. Please let me if I need to make an appt sooner to get the refills for armour thyroid And estrogen/progesterone/testosterone compounded cream.  Thank you.   Tonya Gallardo

## 2022-07-26 NOTE — TELEPHONE ENCOUNTER
Thi, please ask Torres Chun to give her refills to last until her next CPE in Oct with me. I have not seen her since 2020. Torres Chun did her physical Sept 2021. These meds are going to 2 different places. The compounded hormone will need to get called in to [facility] (DP/SNF).

## 2022-10-12 ENCOUNTER — E-VISIT (OUTPATIENT)
Dept: FAMILY MEDICINE CLINIC | Age: 57
End: 2022-10-12

## 2022-10-12 NOTE — PROGRESS NOTES
Called patient and let her know that Dr. Jorge Nolasco is out of town until next Monday. She understood but wanted to have a virtual appt with another provider. Patient was out of [place] in [facility] and told her that we could not do a virtual appt with her being out of state.  She understood and stated that she would get seen somewhere in [facility].

## 2022-11-02 ENCOUNTER — OFFICE VISIT (OUTPATIENT)
Dept: FAMILY MEDICINE CLINIC | Age: 57
End: 2022-11-02
Payer: OTHER GOVERNMENT

## 2022-11-02 ENCOUNTER — TELEPHONE (OUTPATIENT)
Dept: FAMILY MEDICINE CLINIC | Age: 57
End: 2022-11-02

## 2022-11-02 ENCOUNTER — LAB ONLY (OUTPATIENT)
Dept: FAMILY MEDICINE CLINIC | Age: 57
End: 2022-11-02

## 2022-11-02 ENCOUNTER — HOSPITAL ENCOUNTER (OUTPATIENT)
Dept: LAB | Age: 57
Discharge: HOME | End: 2022-11-02
Payer: OTHER GOVERNMENT

## 2022-11-02 VITALS
TEMPERATURE: 97.9 F | OXYGEN SATURATION: 98 % | RESPIRATION RATE: 20 BRPM | HEART RATE: 75 BPM | HEIGHT: 65 IN | DIASTOLIC BLOOD PRESSURE: 84 MMHG | BODY MASS INDEX: 28.16 KG/M2 | SYSTOLIC BLOOD PRESSURE: 132 MMHG | WEIGHT: 169 LBS

## 2022-11-02 DIAGNOSIS — Z01.419 WELL WOMAN EXAM WITH ROUTINE GYNECOLOGICAL EXAM: ICD-10-CM

## 2022-11-02 DIAGNOSIS — Z78.0 MENOPAUSE: ICD-10-CM

## 2022-11-02 DIAGNOSIS — Z11.59 NEED FOR HEPATITIS C SCREENING TEST: ICD-10-CM

## 2022-11-02 DIAGNOSIS — N75.0 BARTHOLIN CYST: ICD-10-CM

## 2022-11-02 DIAGNOSIS — Z12.83 SKIN EXAM, SCREENING FOR CANCER: ICD-10-CM

## 2022-11-02 DIAGNOSIS — Z12.4 SCREENING FOR MALIGNANT NEOPLASM OF CERVIX: ICD-10-CM

## 2022-11-02 DIAGNOSIS — Z01.419 WELL WOMAN EXAM WITH ROUTINE GYNECOLOGICAL EXAM: Primary | ICD-10-CM

## 2022-11-02 PROCEDURE — 88175 CYTOPATH C/V AUTO FLUID REDO: CPT

## 2022-11-02 PROCEDURE — 99396 PREV VISIT EST AGE 40-64: CPT | Performed by: FAMILY MEDICINE

## 2022-11-02 PROCEDURE — 87624 HPV HI-RISK TYP POOLED RSLT: CPT

## 2022-11-02 RX ORDER — ESTRADIOL MICRONIZED 100 %
POWDER (GRAM) MISCELLANEOUS
Qty: 90 G | Refills: 1 | OUTPATIENT
Start: 2022-11-02

## 2022-11-02 RX ORDER — ESTRADIOL MICRONIZED 100 %
POWDER (GRAM) MISCELLANEOUS
Qty: 90 G | Refills: 3 | Status: SHIPPED | OUTPATIENT
Start: 2022-11-02 | End: 2022-11-02 | Stop reason: SDUPTHER

## 2022-11-02 RX ORDER — TESTOSTERONE MICRONIZED 100 %
POWDER (GRAM) MISCELLANEOUS
COMMUNITY
Start: 2022-07-26 | End: 2022-11-02 | Stop reason: SDUPTHER

## 2022-11-02 RX ORDER — CHOLECALCIFEROL TAB 125 MCG (5000 UNIT) 125 MCG
1 TAB ORAL DAILY
COMMUNITY

## 2022-11-02 RX ORDER — SULFAMETHOXAZOLE AND TRIMETHOPRIM 800; 160 MG/1; MG/1
1 TABLET ORAL 2 TIMES DAILY
Qty: 20 TABLET | Refills: 0 | Status: SHIPPED | OUTPATIENT
Start: 2022-11-02 | End: 2022-11-12

## 2022-11-02 NOTE — PROGRESS NOTES
Subjective:   56 y.o. female for Well Woman Check. She is postmenopausal.  Social History: . Pertinent past medical hstory: no history of HTN, DVT, CAD, DM, liver disease, migraines or smoking. Patient Active Problem List    Diagnosis Date Noted    Hypercholesterolemia 03/24/2019    Unspecified hypothyroidism 09/30/2015    Menopause, premature 10/10/2011    Prediabetes      Current Outpatient Medications   Medication Sig Dispense Refill    Estriol Micronized 100 % powd, Estradiol Micronized (Bulk) 100 % powd, Progesterone Micronized (Bulk) 100 % powd, testosterone micronized (bulk) 100 % powd APPLY 3.0WB (10 CLICKS) TO AFFECTED AREA (VAGINALLY) 2 TIMES A DAY 90 g 1    cholecalciferol (VITAMIN D3) (5000 Units/125 mcg) tab tablet Take 1 Tablet by mouth daily. trimethoprim-sulfamethoxazole (BACTRIM DS, SEPTRA DS) 160-800 mg per tablet Take 1 Tablet by mouth two (2) times a day for 10 days. 20 Tablet 0    thyroid, Pork, ([place] Thyroid) 60 mg tablet TAKE 1 TABLET DAILY 90 Tablet 0    acyclovir (ZOVIRAX) 5 % ointment Apply  to affected area every three (3) hours. 15 g 2    albuterol (ProAir HFA) 90 mcg/actuation inhaler Take 2 Puffs by inhalation every four (4) hours as needed for Wheezing. 25.5 g 0    estradioL (ESTRACE) 0.01 % (0.1 mg/gram) vaginal cream apply twice a week 42.5 g 5    zinc sulfate (ZINC-15 PO) Take  by mouth daily. Cetirizine 10 mg cap Take  by mouth.        Allergies   Allergen Reactions    Pcn [Penicillins] Rash     Past Medical History:   Diagnosis Date    Hypercholesterolemia 03/24/2019    Hypothyroidism     Prediabetes     Skin cancer     Thyroid disease      Past Surgical History:   Procedure Laterality Date    COLONOSCOPY N/A 9/10/2019    COLONOSCOPY performed by Jose Cosme MD at [facility] ENDOSCOPY    HX GYN      HX PARTIAL HYSTERECTOMY  2003     Family History   Problem Relation Age of Onset    Other Father         aortic anuerysm     Social History     Tobacco Use    Smoking status: Never    Smokeless tobacco: Never   Substance Use Topics    Alcohol use: Yes     Comment: socially        ROS:  Feeling well. No dyspnea or chest pain on exertion. No abdominal pain, change in bowel habits, black or bloody stools. No urinary tract symptoms. GYN ROS: no breast pain or new or enlarging lumps on self exam, she complains of left sided Bartholin gland cyst which required I&D in ER twice. Menopausal symptoms: none. No neurological complaints. Objective:   Visit Vitals  /84 (BP 1 Location: Right arm, BP Patient Position: Sitting, BP Cuff Size: Adult)   Pulse 75   Temp 97.9 °F (36.6 °C) (Temporal)   Resp 20   Ht 5' 5\" (1.651 m)   Wt 169 lb (76.7 kg)   SpO2 98%   BMI 28.12 kg/m²     The patient appears well, alert, oriented x 3, in no distress. ENT normal.  Neck supple. No adenopathy or thyromegaly. ANNAMARIE. Lungs are clear, good air entry, no wheezes, rhonchi or rales. S1 and S2 normal, no murmurs, regular rate and rhythm. Abdomen soft without tenderness, guarding, mass or organomegaly. Extremities show no edema, normal peripheral pulses. Neurological is normal, no focal findings. PELVIC EXAM: VULVA: vulvar mass left posterior where had Bartholin cyst, VAGINA: normal appearing vagina with normal color and discharge, no lesions, PAP: Pap smear done today  Normal adnexa, non tender. Post hysterectomy. Assessment/Plan:   well woman  postmenopausal  mammogram  pap smear  return annually or prn    ICD-10-CM ICD-9-CM    1. Well woman exam with routine gynecological exam  Z01.419 V72.31 CBC WITH AUTOMATED DIFF      METABOLIC PANEL, COMPREHENSIVE      LIPID PANEL      HEMOGLOBIN A1C WITH EAG      TSH 3RD GENERATION      T4, FREE      VITAMIN D, 25 HYDROXY    [V72.31]      2. Screening for malignant neoplasm of cervix  Z12.4 V76.2 PAP IG, APTIMA HPV AND RFX 16/18,45 ([id number])      3. Need for hepatitis C screening test  Z11.59 V73.89 HEPATITIS C AB      4.  Menopause  Z78.0 627.2 Estriol Micronized 100 % powd, Estradiol Micronized (Bulk) 100 % powd, Progesterone Micronized (Bulk) 100 % powd, testosterone micronized (bulk) 100 % powd      DISCONTINUED: Estriol Micronized 100 % powd, Estradiol Micronized (Bulk) 100 % powd, Progesterone Micronized (Bulk) 100 % powd, testosterone micronized (bulk) 100 % powd      5. Bartholin cyst  N75.0 616.2 trimethoprim-sulfamethoxazole (BACTRIM DS, SEPTRA DS) 160-800 mg per tablet      REFERRAL TO OBSTETRICS AND GYNECOLOGY      6. Skin exam, screening for cancer  Z12.83 V76.43 REFERRAL TO DERMATOLOGY      .

## 2022-11-02 NOTE — TELEPHONE ENCOUNTER
Please obtain derm authorization from [facility] for appt with Dr Yamileth Banks.

## 2022-11-02 NOTE — PROGRESS NOTES
Identified pt with two pt identifiers(name and ). Chief Complaint   Patient presents with    Physical     No concerns    Cyst     Bartholin Cyst that she would like to discuss with you    Referral Request     Dermatology referral request    Medication Refill        Health Maintenance Due   Topic    Hepatitis C Screening     COVID-19 Vaccine (1)    Shingrix Vaccine Age 50> (1 of 2)    Cervical cancer screen     A1C test (Diabetic or Prediabetic)     Flu Vaccine (1)    Depression Screen        Wt Readings from Last 3 Encounters:   22 169 lb (76.7 kg)   21 164 lb (74.4 kg)   20 152 lb 3.2 oz (69 kg)     Temp Readings from Last 3 Encounters:   22 97.9 °F (36.6 °C) (Temporal)   21 98.8 °F (37.1 °C) (Temporal)   20 97.9 °F (36.6 °C) (Oral)     BP Readings from Last 3 Encounters:   22 132/84   21 120/80   20 122/82     Pulse Readings from Last 3 Encounters:   22 75   21 99   20 92         Learning Assessment:  :     Learning Assessment 3/24/2014   PRIMARY LEARNER Patient   HIGHEST LEVEL OF EDUCATION - PRIMARY LEARNER  4 YEARS OF COLLEGE   BARRIERS PRIMARY LEARNER NONE   CO-LEARNER CAREGIVER No   PRIMARY LANGUAGE ENGLISH   LEARNER PREFERENCE PRIMARY LISTENING   ANSWERED BY patient   RELATIONSHIP SELF       Depression Screening:  :     3 most recent PHQ Screens 2022   Little interest or pleasure in doing things Not at all   Feeling down, depressed, irritable, or hopeless Not at all   Total Score PHQ 2 0       Fall Risk Assessment:  :     Fall Risk Assessment, last 12 mths 7/10/2017   Able to walk? Yes   Fall in past 12 months? No       Abuse Screening:  :     Abuse Screening Questionnaire 2022 2021 2020 2019 2016 3/24/2014   Do you ever feel afraid of your partner? N N N N N N   Are you in a relationship with someone who physically or mentally threatens you? N N N N N N   Is it safe for you to go home?  Stephanie Hand Coordination of Care Questionnaire:  :     1) Have you been to an emergency room, urgent care clinic since your last visit? yes ER visit for cyst in [facility] since your last visit? no             2) Have you seen or consulted any other health care providers outside of [address] [place] [address] since your last visit? no  (Include any pap smears or colon screenings in this section.)    3) Do you have an Advance Directive on file? no  Are you interested in receiving information about Advance Directives? yes    Patient is accompanied by N/A I have received verbal consent from Alvin Mujica to discuss any/all medical information while they are present in the room. 4.  For patients aged 39-70: Has the patient had a colonoscopy / FIT/ Cologuard? Yes - no Care Gap present      If the patient is female:    5. For patients aged 41-77: Has the patient had a mammogram within the past 2 years? Yes - no Care Gap present      6. For patients aged 21-65: Has the patient had a pap smear?  No

## 2022-11-03 LAB
25(OH)D3 SERPL-MCNC: 56.8 NG/ML (ref 30–100)
ALBUMIN SERPL-MCNC: 4 G/DL (ref 3.5–5)
ALBUMIN/GLOB SERPL: 1.3 {RATIO} (ref 1.1–2.2)
ALP SERPL-CCNC: 70 U/L (ref 45–117)
ALT SERPL-CCNC: 21 U/L (ref 12–78)
ANION GAP SERPL CALC-SCNC: 6 MMOL/L (ref 5–15)
AST SERPL-CCNC: 14 U/L (ref 15–37)
BASOPHILS # BLD: 0.1 K/UL (ref 0–0.1)
BASOPHILS NFR BLD: 1 % (ref 0–1)
BILIRUB SERPL-MCNC: 0.4 MG/DL (ref 0.2–1)
BUN SERPL-MCNC: 10 MG/DL (ref 6–20)
BUN/CREAT SERPL: 15 (ref 12–20)
CALCIUM SERPL-MCNC: 8.7 MG/DL (ref 8.5–10.1)
CHLORIDE SERPL-SCNC: 104 MMOL/L (ref 97–108)
CHOLEST SERPL-MCNC: 219 MG/DL
CO2 SERPL-SCNC: 29 MMOL/L (ref 21–32)
CREAT SERPL-MCNC: 0.68 MG/DL (ref 0.55–1.02)
DIFFERENTIAL METHOD BLD: ABNORMAL
EOSINOPHIL # BLD: 0.3 K/UL (ref 0–0.4)
EOSINOPHIL NFR BLD: 5 % (ref 0–7)
ERYTHROCYTE [DISTWIDTH] IN BLOOD BY AUTOMATED COUNT: 14 % (ref 11.5–14.5)
EST. AVERAGE GLUCOSE BLD GHB EST-MCNC: 103 MG/DL
GLOBULIN SER CALC-MCNC: 3.2 G/DL (ref 2–4)
GLUCOSE SERPL-MCNC: 93 MG/DL (ref 65–100)
HBA1C MFR BLD: 5.2 % (ref 4–5.6)
HCT VFR BLD AUTO: 37.5 % (ref 35–47)
HCV AB SERPL QL IA: NONREACTIVE
HDLC SERPL-MCNC: 75 MG/DL
HDLC SERPL: 2.9 {RATIO} (ref 0–5)
HGB BLD-MCNC: 12.2 G/DL (ref 11.5–16)
IMM GRANULOCYTES # BLD AUTO: 0 K/UL (ref 0–0.04)
IMM GRANULOCYTES NFR BLD AUTO: 0 % (ref 0–0.5)
LDLC SERPL CALC-MCNC: 130.8 MG/DL (ref 0–100)
LYMPHOCYTES # BLD: 2.1 K/UL (ref 0.8–3.5)
LYMPHOCYTES NFR BLD: 41 % (ref 12–49)
MCH RBC QN AUTO: 32.4 PG (ref 26–34)
MCHC RBC AUTO-ENTMCNC: 32.5 G/DL (ref 30–36.5)
MCV RBC AUTO: 99.7 FL (ref 80–99)
MONOCYTES # BLD: 0.4 K/UL (ref 0–1)
MONOCYTES NFR BLD: 8 % (ref 5–13)
NEUTS SEG # BLD: 2.4 K/UL (ref 1.8–8)
NEUTS SEG NFR BLD: 45 % (ref 32–75)
NRBC # BLD: 0 K/UL (ref 0–0.01)
NRBC BLD-RTO: 0 PER 100 WBC
PLATELET # BLD AUTO: 288 K/UL (ref 150–400)
PMV BLD AUTO: 9.5 FL (ref 8.9–12.9)
POTASSIUM SERPL-SCNC: 4.3 MMOL/L (ref 3.5–5.1)
PROT SERPL-MCNC: 7.2 G/DL (ref 6.4–8.2)
RBC # BLD AUTO: 3.76 M/UL (ref 3.8–5.2)
SODIUM SERPL-SCNC: 139 MMOL/L (ref 136–145)
T4 FREE SERPL-MCNC: 1 NG/DL (ref 0.8–1.5)
TRIGL SERPL-MCNC: 66 MG/DL (ref ?–150)
TSH SERPL DL<=0.05 MIU/L-ACNC: 0.94 UIU/ML (ref 0.36–3.74)
VLDLC SERPL CALC-MCNC: 13.2 MG/DL
WBC # BLD AUTO: 5.2 K/UL (ref 3.6–11)

## 2022-11-06 ENCOUNTER — TELEPHONE (OUTPATIENT)
Dept: FAMILY MEDICINE CLINIC | Age: 57
End: 2022-11-06

## 2022-11-06 DIAGNOSIS — E03.9 ACQUIRED HYPOTHYROIDISM: ICD-10-CM

## 2022-11-06 RX ORDER — LEVOTHYROXINE AND LIOTHYRONINE 38; 9 UG/1; UG/1
TABLET ORAL
Qty: 90 TABLET | Refills: 3 | Status: SHIPPED | OUTPATIENT
Start: 2022-11-06

## 2022-11-07 ENCOUNTER — TELEPHONE (OUTPATIENT)
Dept: FAMILY MEDICINE CLINIC | Age: 57
End: 2022-11-07

## 2022-11-07 NOTE — PROGRESS NOTES
Labs look good except for higher cholesterol numbers. Need to follow low fat diet. Good thyroid level.

## 2022-11-07 NOTE — TELEPHONE ENCOUNTER
----- Message from Wilberto Minor MD sent at 11/6/2022  9:39 PM EST -----  Labs look good except for higher cholesterol numbers. Need to follow low fat diet. Good thyroid level.

## 2022-11-10 ENCOUNTER — TELEPHONE (OUTPATIENT)
Dept: FAMILY MEDICINE CLINIC | Age: 57
End: 2022-11-10

## 2022-11-10 NOTE — TELEPHONE ENCOUNTER
----- Message from Blas Scruggs MD sent at 11/9/2022  6:25 PM EST -----  Negative PAP and negative HPV screen. Repeat 5 years. Epidermal Closure: running

## 2022-11-21 ENCOUNTER — TELEPHONE (OUTPATIENT)
Dept: FAMILY MEDICINE CLINIC | Age: 57
End: 2022-11-21

## 2022-11-21 DIAGNOSIS — M25.561 PAIN AND SWELLING OF RIGHT KNEE: ICD-10-CM

## 2022-11-21 DIAGNOSIS — M25.461 PAIN AND SWELLING OF RIGHT KNEE: ICD-10-CM

## 2022-11-21 DIAGNOSIS — M25.571 ACUTE RIGHT ANKLE PAIN: Primary | ICD-10-CM

## 2022-11-21 NOTE — TELEPHONE ENCOUNTER
----- Message from Sandra Jenniferil sent at 11/21/2022  7:52 AM EST -----  Regarding: FW: Referral request     ----- Message -----  From: Pamela Núñez  Sent: 11/20/2022  10:34 AM EST  To: [facility] Nurse Pool  Subject: Referral request                                 Good Morning- I slipped  on ice last night and had a pop in my right ankle with quite a bit of  swelling. Shooting pain when I attempt to bare weight. I will attach a photo. R knee also swollen with decreased range of motion. Pain has decreased in the knee but not the ankle. Ortho on call [place] states they can see me today (Sunday) and I get a referral on Monday.   Could you please provide a referral.

## 2022-11-21 NOTE — TELEPHONE ENCOUNTER
Please [address] from [address] [place] [address] for visit yesterday 11/20/22.

## 2023-02-08 DIAGNOSIS — Z78.0 MENOPAUSE: ICD-10-CM

## 2023-02-09 RX ORDER — ESTRADIOL MICRONIZED 100 %
POWDER (GRAM) MISCELLANEOUS
Qty: 90 G | Refills: 2 | OUTPATIENT
Start: 2023-02-09

## 2023-02-16 ENCOUNTER — TELEPHONE (OUTPATIENT)
Dept: FAMILY MEDICINE CLINIC | Age: 58
End: 2023-02-16

## 2023-02-16 DIAGNOSIS — E55.9 VITAMIN D DEFICIENCY: ICD-10-CM

## 2023-02-25 DIAGNOSIS — R06.2 WHEEZING: ICD-10-CM

## 2023-02-25 RX ORDER — ALBUTEROL SULFATE 90 UG/1
AEROSOL, METERED RESPIRATORY (INHALATION)
Qty: 25.5 G | Refills: 6 | Status: SHIPPED | OUTPATIENT
Start: 2023-02-25

## 2023-05-20 RX ORDER — LEVOTHYROXINE AND LIOTHYRONINE 38; 9 UG/1; UG/1
TABLET ORAL
COMMUNITY
Start: 2022-11-06

## 2023-05-20 RX ORDER — ACYCLOVIR 50 MG/G
OINTMENT TOPICAL
COMMUNITY
Start: 2022-01-24

## 2023-05-20 RX ORDER — ESTRADIOL 0.1 MG/G
CREAM VAGINAL
COMMUNITY
Start: 2021-09-30

## 2023-05-20 RX ORDER — ALBUTEROL SULFATE 90 UG/1
AEROSOL, METERED RESPIRATORY (INHALATION)
COMMUNITY
Start: 2023-02-25

## 2023-06-08 ENCOUNTER — OFFICE VISIT (OUTPATIENT)
Age: 58
End: 2023-06-08
Payer: OTHER GOVERNMENT

## 2023-06-08 VITALS
HEIGHT: 60 IN | OXYGEN SATURATION: 98 % | SYSTOLIC BLOOD PRESSURE: 126 MMHG | BODY MASS INDEX: 33.18 KG/M2 | DIASTOLIC BLOOD PRESSURE: 82 MMHG | WEIGHT: 169 LBS | HEART RATE: 77 BPM | TEMPERATURE: 97.8 F | RESPIRATION RATE: 16 BRPM

## 2023-06-08 DIAGNOSIS — H57.9 LESION OF EYE: Primary | ICD-10-CM

## 2023-06-08 DIAGNOSIS — Z12.83 SCREENING FOR SKIN CANCER: ICD-10-CM

## 2023-06-08 PROCEDURE — 99213 OFFICE O/P EST LOW 20 MIN: CPT | Performed by: NURSE PRACTITIONER

## 2023-06-08 SDOH — ECONOMIC STABILITY: HOUSING INSECURITY
IN THE LAST 12 MONTHS, WAS THERE A TIME WHEN YOU DID NOT HAVE A STEADY PLACE TO SLEEP OR SLEPT IN A SHELTER (INCLUDING NOW)?: NO

## 2023-06-08 SDOH — ECONOMIC STABILITY: TRANSPORTATION INSECURITY
IN THE PAST 12 MONTHS, HAS LACK OF TRANSPORTATION KEPT YOU FROM MEETINGS, WORK, OR FROM GETTING THINGS NEEDED FOR DAILY LIVING?: NO

## 2023-06-08 SDOH — ECONOMIC STABILITY: INCOME INSECURITY: HOW HARD IS IT FOR YOU TO PAY FOR THE VERY BASICS LIKE FOOD, HOUSING, MEDICAL CARE, AND HEATING?: NOT HARD AT ALL

## 2023-06-08 SDOH — ECONOMIC STABILITY: FOOD INSECURITY: WITHIN THE PAST 12 MONTHS, THE FOOD YOU BOUGHT JUST DIDN'T LAST AND YOU DIDN'T HAVE MONEY TO GET MORE.: NEVER TRUE

## 2023-06-08 SDOH — ECONOMIC STABILITY: FOOD INSECURITY: WITHIN THE PAST 12 MONTHS, YOU WORRIED THAT YOUR FOOD WOULD RUN OUT BEFORE YOU GOT MONEY TO BUY MORE.: NEVER TRUE

## 2023-06-08 ASSESSMENT — PATIENT HEALTH QUESTIONNAIRE - PHQ9
2. FEELING DOWN, DEPRESSED OR HOPELESS: 0
SUM OF ALL RESPONSES TO PHQ QUESTIONS 1-9: 0
SUM OF ALL RESPONSES TO PHQ QUESTIONS 1-9: 0
SUM OF ALL RESPONSES TO PHQ9 QUESTIONS 1 & 2: 0
SUM OF ALL RESPONSES TO PHQ QUESTIONS 1-9: 0
1. LITTLE INTEREST OR PLEASURE IN DOING THINGS: 0
SUM OF ALL RESPONSES TO PHQ QUESTIONS 1-9: 0
2. FEELING DOWN, DEPRESSED OR HOPELESS: 0
SUM OF ALL RESPONSES TO PHQ QUESTIONS 1-9: 0
1. LITTLE INTEREST OR PLEASURE IN DOING THINGS: 0
SUM OF ALL RESPONSES TO PHQ9 QUESTIONS 1 & 2: 0

## 2023-06-08 NOTE — PROGRESS NOTES
Identified pt with two pt identifiers(name and ). Chief Complaint   Patient presents with    Eye Problem     Spot on R eye appeared a month ago     Referral - General     Dermatology appt in .         Health Maintenance Due   Topic    COVID-19 Vaccine (1)    HIV screen     Shingles vaccine (1 of 2)       Wt Readings from Last 3 Encounters:   23 169 lb (76.7 kg)   22 169 lb (76.7 kg)   21 164 lb (74.4 kg)     Temp Readings from Last 3 Encounters:   23 97.8 °F (36.6 °C) (Temporal)     BP Readings from Last 3 Encounters:   23 126/82   22 132/84   21 120/80     Pulse Readings from Last 3 Encounters:   23 77   22 75   21 99           Depression Screening:  :     PHQ-9 Questionaire 2023   Little interest or pleasure in doing things 0 0 0   Feeling down, depressed, or hopeless 0 0 0   PHQ-9 Total Score 0 0 0        Fall Risk Assessment:  :   No flowsheet data found. Abuse Screening:  :   No flowsheet data found. Coordination of Care Questionnaire:  :     1. \"Have you been to the ER, urgent care clinic since your last visit? Hospitalized since your last visit? \" no    2. \"Have you seen or consulted any other health care providers outside of the [address] [place] [address] since your last visit? \" no     3. This patient is accompanied in the office by her self. 4. For patients aged 39-70: Has the patient had a colonoscopy / FIT/ Cologuard? Yes - no Care Gap present      If the patient is female:    5. For patients aged 41-77: Has the patient had a mammogram within the past 2 years? Yes - no Care Gap present      6. For patients aged 21-65: Has the patient had a pap smear?  Yes - no Care Gap present

## 2023-06-08 NOTE — PROGRESS NOTES
Subjective:      Patient ID: Rosaura Fitzpatrick is a 56 y.o. female. HPI  Pt presents with \"lesion on eye and referral to derm\"    Pt states that she noted what she thought was maybe a stye on the bottom of her right eye about a month ago  It started to look like a pimple or abscess, but did not hurt at all  It went away on its own, but has started to return  There is swelling and redness in the lower lid of right eye  No pain  No discharge  No itching    In addition, she has an appointment coming up with her dermatologist on 1/23  She needs referral  Review of Systems    Objective:   Physical Exam  Constitutional:       Appearance: Normal appearance. Eyes:        Comments: Erythema noted, mild amount of swelling   Cardiovascular:      Rate and Rhythm: Normal rate and regular rhythm. Heart sounds: Normal heart sounds. Pulmonary:      Effort: Pulmonary effort is normal.      Breath sounds: Normal breath sounds. Neurological:      Mental Status: She is alert. Psychiatric:         Mood and Affect: Mood normal.         Behavior: Behavior normal.       Assessment / Plan:          Diagnosis Orders   1. Lesion of eye  External Referral To Ophthalmology      2. Screening for skin cancer  External Referral To Dermatology        Educated about calling specialists for appointments today  Should be seen by [facility] for further eval    Pt informed to return to office with worsening of symptoms, or PRN with any questions or concerns. Pt verbalizes understanding of plan of care and denies further questions or concerns at this time. Cell Phone/PDA (specify)/Clothing

## 2023-11-28 ENCOUNTER — TELEPHONE (OUTPATIENT)
Age: 58
End: 2023-11-28

## 2023-11-29 NOTE — TELEPHONE ENCOUNTER
I signed refill faxed for HRT treatment from [facility] Please put med in chart as active if you are able.

## 2023-11-30 ENCOUNTER — TELEPHONE (OUTPATIENT)
Age: 58
End: 2023-11-30

## 2023-12-28 RX ORDER — THYROID 60 MG/1
60 TABLET ORAL DAILY
Qty: 90 TABLET | Refills: 0 | Status: SHIPPED | OUTPATIENT
Start: 2023-12-28

## 2024-01-23 ENCOUNTER — OFFICE VISIT (OUTPATIENT)
Age: 59
End: 2024-01-23
Payer: OTHER GOVERNMENT

## 2024-01-23 VITALS
HEART RATE: 80 BPM | WEIGHT: 169 LBS | BODY MASS INDEX: 28.16 KG/M2 | HEIGHT: 65 IN | OXYGEN SATURATION: 97 % | SYSTOLIC BLOOD PRESSURE: 124 MMHG | DIASTOLIC BLOOD PRESSURE: 88 MMHG | TEMPERATURE: 98.6 F

## 2024-01-23 DIAGNOSIS — E78.00 HYPERCHOLESTEROLEMIA: Primary | ICD-10-CM

## 2024-01-23 DIAGNOSIS — E03.9 ACQUIRED HYPOTHYROIDISM: ICD-10-CM

## 2024-01-23 DIAGNOSIS — Z85.828 HISTORY OF SKIN CANCER IN ADULTHOOD: ICD-10-CM

## 2024-01-23 DIAGNOSIS — Z13.1 SCREENING FOR DIABETES MELLITUS: ICD-10-CM

## 2024-01-23 DIAGNOSIS — E55.9 VITAMIN D DEFICIENCY, UNSPECIFIED: ICD-10-CM

## 2024-01-23 DIAGNOSIS — E78.00 HYPERCHOLESTEROLEMIA: ICD-10-CM

## 2024-01-23 PROCEDURE — 99213 OFFICE O/P EST LOW 20 MIN: CPT | Performed by: INTERNAL MEDICINE

## 2024-01-23 ASSESSMENT — PATIENT HEALTH QUESTIONNAIRE - PHQ9
SUM OF ALL RESPONSES TO PHQ9 QUESTIONS 1 & 2: 0
SUM OF ALL RESPONSES TO PHQ QUESTIONS 1-9: 0
2. FEELING DOWN, DEPRESSED OR HOPELESS: 0
SUM OF ALL RESPONSES TO PHQ QUESTIONS 1-9: 0
1. LITTLE INTEREST OR PLEASURE IN DOING THINGS: 0
SUM OF ALL RESPONSES TO PHQ QUESTIONS 1-9: 0
SUM OF ALL RESPONSES TO PHQ QUESTIONS 1-9: 0

## 2024-01-23 ASSESSMENT — ENCOUNTER SYMPTOMS
ALLERGIC/IMMUNOLOGIC NEGATIVE: 1
RESPIRATORY NEGATIVE: 1
GASTROINTESTINAL NEGATIVE: 1
EYES NEGATIVE: 1

## 2024-01-23 NOTE — PROGRESS NOTES
Identified pt with two pt identifiers(name and ). Reviewed record in preparation for visit and have obtained necessary documentation. All patient medications has been reviewed.  Chief Complaint   Patient presents with    Medication Refill    Labs       Vitals:    24 1409   BP: 124/88   Pulse: 80   Temp: 98.6 °F (37 °C)   SpO2: 97%                   Coordination of Care Questionnaire:   1) Have you been to an emergency room, urgent care, or hospitalized since your last visit?   no    2. Have seen or consulted any other health care provider since your last visit? no

## 2024-01-24 LAB
25(OH)D3 SERPL-MCNC: 59.9 NG/ML (ref 30–100)
ALBUMIN SERPL-MCNC: 4.3 G/DL (ref 3.5–5)
ALBUMIN/GLOB SERPL: 1.3 (ref 1.1–2.2)
ALP SERPL-CCNC: 60 U/L (ref 45–117)
ALT SERPL-CCNC: 26 U/L (ref 12–78)
ANION GAP SERPL CALC-SCNC: 7 MMOL/L (ref 5–15)
AST SERPL-CCNC: 12 U/L (ref 15–37)
BASOPHILS # BLD: 0.1 K/UL (ref 0–0.1)
BASOPHILS NFR BLD: 1 % (ref 0–1)
BILIRUB SERPL-MCNC: 0.4 MG/DL (ref 0.2–1)
BUN SERPL-MCNC: 20 MG/DL (ref 6–20)
BUN/CREAT SERPL: 23 (ref 12–20)
CALCIUM SERPL-MCNC: 9.1 MG/DL (ref 8.5–10.1)
CHLORIDE SERPL-SCNC: 104 MMOL/L (ref 97–108)
CHOLEST SERPL-MCNC: 291 MG/DL
CO2 SERPL-SCNC: 27 MMOL/L (ref 21–32)
CREAT SERPL-MCNC: 0.86 MG/DL (ref 0.55–1.02)
DIFFERENTIAL METHOD BLD: NORMAL
EOSINOPHIL # BLD: 0.2 K/UL (ref 0–0.4)
EOSINOPHIL NFR BLD: 3 % (ref 0–7)
ERYTHROCYTE [DISTWIDTH] IN BLOOD BY AUTOMATED COUNT: 13.1 % (ref 11.5–14.5)
EST. AVERAGE GLUCOSE BLD GHB EST-MCNC: 105 MG/DL
GLOBULIN SER CALC-MCNC: 3.2 G/DL (ref 2–4)
GLUCOSE SERPL-MCNC: 93 MG/DL (ref 65–100)
HBA1C MFR BLD: 5.3 % (ref 4–5.6)
HCT VFR BLD AUTO: 38.9 % (ref 35–47)
HDLC SERPL-MCNC: 71 MG/DL
HDLC SERPL: 4.1 (ref 0–5)
HGB BLD-MCNC: 12.9 G/DL (ref 11.5–16)
IMM GRANULOCYTES # BLD AUTO: 0 K/UL (ref 0–0.04)
IMM GRANULOCYTES NFR BLD AUTO: 0 % (ref 0–0.5)
LDLC SERPL CALC-MCNC: 196.6 MG/DL (ref 0–100)
LYMPHOCYTES # BLD: 2.1 K/UL (ref 0.8–3.5)
LYMPHOCYTES NFR BLD: 32 % (ref 12–49)
MCH RBC QN AUTO: 30.7 PG (ref 26–34)
MCHC RBC AUTO-ENTMCNC: 33.2 G/DL (ref 30–36.5)
MCV RBC AUTO: 92.6 FL (ref 80–99)
MONOCYTES # BLD: 0.6 K/UL (ref 0–1)
MONOCYTES NFR BLD: 9 % (ref 5–13)
NEUTS SEG # BLD: 3.5 K/UL (ref 1.8–8)
NEUTS SEG NFR BLD: 55 % (ref 32–75)
NRBC # BLD: 0 K/UL (ref 0–0.01)
NRBC BLD-RTO: 0 PER 100 WBC
PLATELET # BLD AUTO: 274 K/UL (ref 150–400)
PMV BLD AUTO: 9.9 FL (ref 8.9–12.9)
POTASSIUM SERPL-SCNC: 4.1 MMOL/L (ref 3.5–5.1)
PROT SERPL-MCNC: 7.5 G/DL (ref 6.4–8.2)
RBC # BLD AUTO: 4.2 M/UL (ref 3.8–5.2)
SODIUM SERPL-SCNC: 138 MMOL/L (ref 136–145)
T4 FREE SERPL-MCNC: 1.1 NG/DL (ref 0.8–1.5)
TRIGL SERPL-MCNC: 117 MG/DL
TSH SERPL DL<=0.05 MIU/L-ACNC: 1.18 UIU/ML (ref 0.36–3.74)
VLDLC SERPL CALC-MCNC: 23.4 MG/DL
WBC # BLD AUTO: 6.4 K/UL (ref 3.6–11)

## 2024-01-24 NOTE — PROGRESS NOTES
2024    Assessment & Plan   1. Hypercholesterolemia  -     CBC with Auto Differential; Future  -     Comprehensive Metabolic Panel; Future  -     Lipid Panel; Future  2. Vitamin D deficiency, unspecified  -     Vitamin D 25 Hydroxy; Future  3. Acquired hypothyroidism  -     T4, Free; Future  -     TSH; Future  4. Screening for diabetes mellitus  -     Hemoglobin A1C; Future  5. History of skin cancer in adulthood  -     External Referral To Dermatology     I have discussed the diagnosis with the patient and the intended treatment plan as seen in the above orders. The patient has received an after-visit summary and questions were answered concerning future plans. Asked to return should symptoms worsen or not improve with treatment. Any pending labs and studies will be relayed to patient when they become available.     Pt verbalizes understanding of plan of care and denies further questions or concerns at this time.     Return if symptoms worsen or fail to improve.    No follow-ups on file.  Lidya Mcpherson (:  1965) is a 58 y.o. female, here for a preventive medicine evaluation.  She has been doing well and no major medical issues. She needs fasting labs including evaluation of thyroid.   She is usually seen by Dr. Peter.   She would like a re-referral to her dermatologist (Dr. Lamar with [facility] dermatology).   Fasting for labs.   Has the below chronic medical issues.     Patient Active Problem List   Diagnosis    Hypercholesterolemia    Menopause, premature    Prediabetes    Vitamin D deficiency       Review of Systems   Constitutional: Negative.    HENT: Negative.     Eyes: Negative.    Respiratory: Negative.     Cardiovascular: Negative.    Gastrointestinal: Negative.    Endocrine: Negative.    Genitourinary: Negative.    Musculoskeletal: Negative.    Skin: Negative.    Allergic/Immunologic: Negative.    Neurological: Negative.    Hematological: Negative.    Psychiatric/Behavioral:

## 2024-01-26 ENCOUNTER — TELEPHONE (OUTPATIENT)
Age: 59
End: 2024-01-26

## 2024-01-26 NOTE — TELEPHONE ENCOUNTER
Called patient and left VM to return call to office for lab results and that they were also on her Mychart.

## 2024-01-26 NOTE — TELEPHONE ENCOUNTER
----- Message from Loree Vicente MD sent at 1/26/2024  7:08 AM EST -----  Patient of Dr. Peter who came in for lab work and visit.   Please let her know that her cholesterol levels are very high.   The 10-year ASCVD risk score (Kat VINCENT, et al., 2019) is: 2.4%  This means that her risk is low since <7.5%. However, recommendation by the national cholesterol education program (NCEP) is that patient who have LDL >160 should consider being on a low dose statin.   Other labs are stable. I have also sent patient a message on [employer], but wanted to make her PCP aware.

## 2024-07-17 ENCOUNTER — TELEPHONE (OUTPATIENT)
Age: 59
End: 2024-07-17

## 2024-07-17 NOTE — TELEPHONE ENCOUNTER
Patient needs a refill for bioidentical hormone fax to [facility].  I have not seen patient since 2022.  Please call patient to arrange office visit to see me.

## 2024-08-06 SDOH — ECONOMIC STABILITY: FOOD INSECURITY: WITHIN THE PAST 12 MONTHS, THE FOOD YOU BOUGHT JUST DIDN'T LAST AND YOU DIDN'T HAVE MONEY TO GET MORE.: NEVER TRUE

## 2024-08-06 SDOH — ECONOMIC STABILITY: FOOD INSECURITY: WITHIN THE PAST 12 MONTHS, YOU WORRIED THAT YOUR FOOD WOULD RUN OUT BEFORE YOU GOT MONEY TO BUY MORE.: NEVER TRUE

## 2024-08-06 SDOH — ECONOMIC STABILITY: INCOME INSECURITY: HOW HARD IS IT FOR YOU TO PAY FOR THE VERY BASICS LIKE FOOD, HOUSING, MEDICAL CARE, AND HEATING?: NOT HARD AT ALL

## 2024-08-07 ENCOUNTER — TELEMEDICINE (OUTPATIENT)
Age: 59
End: 2024-08-07
Payer: OTHER GOVERNMENT

## 2024-08-07 DIAGNOSIS — R73.03 PREDIABETES: ICD-10-CM

## 2024-08-07 DIAGNOSIS — Z85.828 HISTORY OF SKIN CANCER IN ADULTHOOD: ICD-10-CM

## 2024-08-07 DIAGNOSIS — E78.00 HYPERCHOLESTEROLEMIA: ICD-10-CM

## 2024-08-07 DIAGNOSIS — E28.319 MENOPAUSE, PREMATURE: Primary | ICD-10-CM

## 2024-08-07 PROCEDURE — 99213 OFFICE O/P EST LOW 20 MIN: CPT | Performed by: FAMILY MEDICINE

## 2024-08-07 SDOH — ECONOMIC STABILITY: FOOD INSECURITY

## 2024-08-07 SDOH — ECONOMIC STABILITY: INCOME INSECURITY

## 2024-08-07 SDOH — ECONOMIC STABILITY: HOUSING INSECURITY

## 2024-08-07 NOTE — PROGRESS NOTES
Lidya Mcpherson, was evaluated through a synchronous (real-time) audio-video encounter. The patient (or guardian if applicable) is aware that this is a billable service, which includes applicable co-pays. This Virtual Visit was conducted with patient's (and/or legal guardian's) consent. Patient identification was verified, and a caregiver was present when appropriate.   The patient was located at Home: [address] [place] [id number]  Provider was located at Facility (Appt Dept): [address] [place] [id number]  Confirm you are appropriately licensed, registered, or certified to deliver care in the state where the patient is located as indicated above. If you are not or unsure, please re-schedule the visit: Yes, I confirm.     Lidya Mcpherson (:  1965) is a Established patient, presenting virtually for evaluation of the following:    Assessment & Plan   Below is the assessment and plan developed based on review of pertinent history, physical exam, labs, studies, and medications.  1. Menopause, premature  -     Estradiol; Future  -     Testosterone, free, total; Future  -     DHEA-Sulfate; Future  -     Progesterone; Future  2. Hypercholesterolemia  -     Lipid Panel; Future  3. Prediabetes  -     Hemoglobin A1C; Future  4. History of skin cancer in adulthood  -     External Referral To Dermatology    Patient will come in for fasting labs and hormone levels.  Will obtain [facility] authorization for dermatology appointment.  She will arrange to follow-up with medical provider who is knowledgeable with bioidentical hormone therapy once lab results are available.  No follow-ups on file.       Subjective   HPI  Follow-up history of menopause.  She is on bioidentical hormone replacement therapy.  It has been a while since she has had hormone levels checked.  Patient feels she might need adjustment of dose.  Also follow-up on elevated cholesterol from January.  At that time patient

## 2024-08-07 NOTE — PROGRESS NOTES
Rm    Chief Complaint   Patient presents with    Medication Refill    Labs Only    Referral - General     Dermatology         There were no vitals taken for this visit.     1. Have you been to the ER, urgent care clinic since your last visit?  Hospitalized since your last visit? No     2. Have you seen or consulted any other health care providers outside of the [facility] since your last visit?  Include any pap smears or colon screening. No     Health Maintenance Due   Topic Date Due    Hepatitis B vaccine (1 of 3 - 3-dose series) Never done    COVID-19 Vaccine (1) Never done    Shingles vaccine (1 of 2) Never done    Breast cancer screen  06/21/2024    Flu vaccine (1) 08/01/2024             No data to display                 Failed to redirect to the Timeline version of the [employer].    Failed to redirect to the Timeline version of the [employer].

## 2024-08-08 DIAGNOSIS — E28.319 MENOPAUSE, PREMATURE: ICD-10-CM

## 2024-08-08 DIAGNOSIS — R73.03 PREDIABETES: ICD-10-CM

## 2024-08-08 DIAGNOSIS — E78.00 HYPERCHOLESTEROLEMIA: ICD-10-CM

## 2024-08-08 LAB
CHOLEST SERPL-MCNC: 244 MG/DL
EST. AVERAGE GLUCOSE BLD GHB EST-MCNC: 111 MG/DL
ESTRADIOL SERPL-MCNC: 93 PG/ML
HBA1C MFR BLD: 5.5 % (ref 4–5.6)
HDLC SERPL-MCNC: 66 MG/DL
HDLC SERPL: 3.7 (ref 0–5)
LDLC SERPL CALC-MCNC: 155.8 MG/DL (ref 0–100)
TRIGL SERPL-MCNC: 111 MG/DL
VLDLC SERPL CALC-MCNC: 22.2 MG/DL

## 2024-08-09 ENCOUNTER — TELEPHONE (OUTPATIENT)
Age: 59
End: 2024-08-09

## 2024-08-09 LAB
DHEA-S SERPL-MCNC: 70.3 UG/DL (ref 29.4–220.5)
PROGEST SERPL-MCNC: 0.8 NG/ML

## 2024-08-09 NOTE — TELEPHONE ENCOUNTER
----- Message from Ashley Peter MD sent at 8/8/2024  7:45 PM EDT -----  Cholesterol numbers are better but remain above goal.  Normal hemoglobin A1c shows good sugar control.

## 2024-08-10 LAB
TESTOST FREE SERPL-MCNC: 1 PG/ML (ref 0–4.2)
TESTOST SERPL-MCNC: 25 NG/DL (ref 4–50)

## 2024-09-20 RX ORDER — THYROID 60 MG/1
60 TABLET ORAL DAILY
Qty: 90 TABLET | Refills: 3 | Status: SHIPPED | OUTPATIENT
Start: 2024-09-20

## 2024-10-30 ENCOUNTER — TELEPHONE (OUTPATIENT)
Age: 59
End: 2024-10-30

## 2024-10-30 DIAGNOSIS — Z13.6 SCREENING FOR CARDIOVASCULAR CONDITION: Primary | ICD-10-CM

## 2024-11-13 ENCOUNTER — HOSPITAL ENCOUNTER (OUTPATIENT)
Facility: HOSPITAL | Age: 59
Discharge: HOME | End: 2024-11-16
Attending: FAMILY MEDICINE

## 2024-11-13 DIAGNOSIS — Z13.6 SCREENING FOR CARDIOVASCULAR CONDITION: ICD-10-CM

## 2024-11-13 PROCEDURE — 75571 CT HRT W/O DYE W/CA TEST: CPT

## 2024-11-19 ENCOUNTER — TELEPHONE (OUTPATIENT)
Age: 59
End: 2024-11-19

## 2024-11-19 NOTE — TELEPHONE ENCOUNTER
----- Message from Dr. Ashley Peter MD sent at 11/18/2024 10:54 PM EST -----  CT cardiac calcium score is 0. This means no evidence of coronary artery disease. Great!!

## 2025-01-31 ENCOUNTER — COMMUNITY OUTREACH (OUTPATIENT)
Age: 60
End: 2025-01-31

## 2025-05-27 RX ORDER — ALBUTEROL SULFATE 90 UG/1
2 INHALANT RESPIRATORY (INHALATION) EVERY 4 HOURS PRN
Qty: 18 G | Refills: 3 | Status: SHIPPED | OUTPATIENT
Start: 2025-05-27

## 2025-05-27 RX ORDER — ACYCLOVIR 50 MG/G
OINTMENT TOPICAL 4 TIMES DAILY PRN
Qty: 15 G | Refills: 3 | Status: SHIPPED | OUTPATIENT
Start: 2025-05-27

## 2025-05-27 RX ORDER — ESTRADIOL 0.1 MG/G
1 CREAM VAGINAL
Qty: 42.5 G | Refills: 3 | Status: SHIPPED | OUTPATIENT
Start: 2025-05-29

## (undated) DEVICE — ADULT SPO2 SENSOR: Brand: NELLCOR

## (undated) DEVICE — SOLIDIFIER MEDC 1200ML -- CONVERT TO 356117

## (undated) DEVICE — BAG BELONG PT PERS CLEAR HANDL

## (undated) DEVICE — CUFF BLD PRSS AD SZ 11 FOR 25-34CM 1 TB TRIPURPOSE CONN

## (undated) DEVICE — KENDALL RADIOLUCENT FOAM MONITORING ELECTRODE -RECTANGULAR SHAPE: Brand: KENDALL

## (undated) DEVICE — Device

## (undated) DEVICE — 1200 GUARD II KIT W/5MM TUBE W/O VAC TUBE: Brand: GUARDIAN

## (undated) DEVICE — KIT COLON W/ 1.1OZ LUB AND 2 END